# Patient Record
Sex: FEMALE | Race: WHITE | NOT HISPANIC OR LATINO | Employment: OTHER | URBAN - METROPOLITAN AREA
[De-identification: names, ages, dates, MRNs, and addresses within clinical notes are randomized per-mention and may not be internally consistent; named-entity substitution may affect disease eponyms.]

---

## 2023-03-10 PROBLEM — R41.3 MEMORY LOSS: Status: ACTIVE | Noted: 2023-03-10

## 2023-03-10 PROBLEM — R51.9 HEADACHE: Status: ACTIVE | Noted: 2023-03-10

## 2023-03-10 PROBLEM — M79.7 FIBROMYALGIA: Status: ACTIVE | Noted: 2023-03-10

## 2023-03-10 PROBLEM — R05.9 COUGH: Status: ACTIVE | Noted: 2023-03-10

## 2023-03-10 PROBLEM — E55.9 VITAMIN D DEFICIENCY: Status: ACTIVE | Noted: 2023-03-10

## 2023-03-10 PROBLEM — M47.816 LUMBAR SPONDYLOSIS: Status: ACTIVE | Noted: 2023-03-10

## 2023-03-10 PROBLEM — M51.379 DISC DEGENERATION, LUMBOSACRAL: Status: ACTIVE | Noted: 2023-03-10

## 2023-03-10 PROBLEM — J06.9 URI, ACUTE: Status: ACTIVE | Noted: 2023-03-10

## 2023-03-10 PROBLEM — R15.9 FECAL INCONTINENCE: Status: ACTIVE | Noted: 2023-03-10

## 2023-03-10 PROBLEM — G43.909 MIGRAINES: Status: ACTIVE | Noted: 2023-03-10

## 2023-03-10 PROBLEM — K58.9 IRRITABLE BOWEL SYNDROME: Status: ACTIVE | Noted: 2023-03-10

## 2023-03-10 PROBLEM — N64.89 FULLNESS OF BREAST: Status: ACTIVE | Noted: 2023-03-10

## 2023-03-10 PROBLEM — R90.89 ABNORMAL BRAIN MRI: Status: ACTIVE | Noted: 2023-03-10

## 2023-03-10 PROBLEM — H00.012 HORDEOLUM EXTERNUM OF RIGHT LOWER EYELID: Status: ACTIVE | Noted: 2023-03-10

## 2023-03-10 PROBLEM — M51.26 OTHER INTERVERTEBRAL DISC DISPLACEMENT, LUMBAR REGION: Status: ACTIVE | Noted: 2023-03-10

## 2023-03-10 PROBLEM — M19.90 ARTHRITIS: Status: ACTIVE | Noted: 2023-03-10

## 2023-03-10 PROBLEM — R06.83 SNORING: Status: ACTIVE | Noted: 2023-03-10

## 2023-03-10 PROBLEM — M43.10 DEGENERATIVE SPONDYLOLISTHESIS: Status: ACTIVE | Noted: 2023-03-10

## 2023-03-10 PROBLEM — L65.9 HAIR LOSS: Status: ACTIVE | Noted: 2023-03-10

## 2023-03-10 PROBLEM — G47.00 INSOMNIA: Status: ACTIVE | Noted: 2023-03-10

## 2023-03-10 PROBLEM — G47.19 EXCESSIVE DAYTIME SLEEPINESS: Status: ACTIVE | Noted: 2023-03-10

## 2023-03-10 PROBLEM — M51.37 DISC DEGENERATION, LUMBOSACRAL: Status: ACTIVE | Noted: 2023-03-10

## 2023-03-10 PROBLEM — G56.01 CARPAL TUNNEL SYNDROME OF RIGHT WRIST: Status: ACTIVE | Noted: 2023-03-10

## 2023-03-10 PROBLEM — F41.9 ANXIETY: Status: ACTIVE | Noted: 2023-03-10

## 2023-03-10 PROBLEM — R34 OLIGOURIA: Status: ACTIVE | Noted: 2023-03-10

## 2023-03-10 PROBLEM — R06.09 DYSPNEA ON EXERTION: Status: ACTIVE | Noted: 2023-03-10

## 2023-03-10 PROBLEM — G95.9 CERVICAL MYELOPATHY (MULTI): Status: ACTIVE | Noted: 2023-03-10

## 2023-03-10 PROBLEM — M96.1 POSTLAMINECTOMY SYNDROME, LUMBAR: Status: ACTIVE | Noted: 2023-03-10

## 2023-03-10 PROBLEM — M81.0 OSTEOPOROSIS: Status: ACTIVE | Noted: 2023-03-10

## 2023-03-10 PROBLEM — F32.A DEPRESSION: Status: ACTIVE | Noted: 2023-03-10

## 2023-03-10 PROBLEM — M51.27 OTHER INTERVERTEBRAL DISC DISPLACEMENT, LUMBOSACRAL REGION: Status: ACTIVE | Noted: 2023-03-10

## 2023-03-10 PROBLEM — G45.9 TIA (TRANSIENT ISCHEMIC ATTACK): Status: ACTIVE | Noted: 2023-03-10

## 2023-03-10 PROBLEM — M48.02 CERVICAL STENOSIS OF SPINE: Status: ACTIVE | Noted: 2023-03-10

## 2023-03-10 PROBLEM — E78.00 HYPERCHOLESTEROLEMIA: Status: ACTIVE | Noted: 2023-03-10

## 2023-03-10 PROBLEM — B02.9 HERPES ZOSTER: Status: ACTIVE | Noted: 2023-03-10

## 2023-03-10 PROBLEM — G43.409 HEMIPLEGIC MIGRAINE: Status: ACTIVE | Noted: 2023-03-10

## 2023-03-10 PROBLEM — M54.50 LUMBAGO: Status: ACTIVE | Noted: 2023-03-10

## 2023-03-10 PROBLEM — M54.16 LUMBAR RADICULOPATHY: Status: ACTIVE | Noted: 2023-03-10

## 2023-03-10 PROBLEM — G25.81 RESTLESS LEGS SYNDROME: Status: ACTIVE | Noted: 2023-03-10

## 2023-03-10 PROBLEM — R11.2 NAUSEA AND VOMITING: Status: ACTIVE | Noted: 2023-03-10

## 2023-03-10 PROBLEM — I67.1 CEREBRAL ANEURYSM, NONRUPTURED (HHS-HCC): Status: ACTIVE | Noted: 2023-03-10

## 2023-03-10 PROBLEM — R63.4 WEIGHT LOSS: Status: ACTIVE | Noted: 2023-03-10

## 2023-03-10 PROBLEM — M48.04 THORACIC SPINAL STENOSIS: Status: ACTIVE | Noted: 2023-03-10

## 2023-03-10 PROBLEM — E53.8 VITAMIN B12 DEFICIENCY: Status: ACTIVE | Noted: 2023-03-10

## 2023-03-10 PROBLEM — R07.89 CHEST WALL PAIN: Status: ACTIVE | Noted: 2023-03-10

## 2023-03-10 PROBLEM — R61 HYPERHIDROSIS: Status: ACTIVE | Noted: 2023-03-10

## 2023-03-10 PROBLEM — D69.6 THROMBOCYTOPENIA (CMS-HCC): Status: ACTIVE | Noted: 2023-03-10

## 2023-03-10 PROBLEM — R73.9 HYPERGLYCEMIA: Status: ACTIVE | Noted: 2023-03-10

## 2023-03-10 PROBLEM — R20.0 NUMBNESS OF HAND: Status: ACTIVE | Noted: 2023-03-10

## 2023-03-10 PROBLEM — G47.33 OSA (OBSTRUCTIVE SLEEP APNEA): Status: ACTIVE | Noted: 2023-03-10

## 2023-03-10 PROBLEM — K21.9 GASTROESOPHAGEAL REFLUX DISEASE: Status: ACTIVE | Noted: 2023-03-10

## 2023-03-10 PROBLEM — I63.9 STROKE (MULTI): Status: ACTIVE | Noted: 2023-03-10

## 2023-03-10 RX ORDER — BACLOFEN 10 MG/1
10 TABLET ORAL 3 TIMES DAILY
COMMUNITY

## 2023-03-10 RX ORDER — SERTRALINE HYDROCHLORIDE 100 MG/1
200 TABLET, FILM COATED ORAL EVERY MORNING
COMMUNITY
Start: 2016-08-01 | End: 2023-08-21 | Stop reason: SDUPTHER

## 2023-03-10 RX ORDER — QUETIAPINE FUMARATE 100 MG/1
100 TABLET, FILM COATED ORAL 2 TIMES DAILY PRN
COMMUNITY
Start: 2016-08-24

## 2023-03-10 RX ORDER — ACETAMINOPHEN 500 MG
5-10 TABLET ORAL NIGHTLY PRN
COMMUNITY

## 2023-03-10 RX ORDER — ONDANSETRON 4 MG/1
4 TABLET, FILM COATED ORAL EVERY 12 HOURS PRN
COMMUNITY
Start: 2022-09-26

## 2023-03-10 RX ORDER — GABAPENTIN 600 MG/1
1200 TABLET ORAL 2 TIMES DAILY
COMMUNITY

## 2023-03-10 RX ORDER — DICYCLOMINE HYDROCHLORIDE 10 MG/1
10 CAPSULE ORAL EVERY 6 HOURS PRN
COMMUNITY
Start: 2021-05-08 | End: 2023-12-08 | Stop reason: WASHOUT

## 2023-03-10 RX ORDER — TRAZODONE HYDROCHLORIDE 100 MG/1
100-200 TABLET ORAL NIGHTLY
COMMUNITY
Start: 2012-09-10 | End: 2023-12-08 | Stop reason: SDUPTHER

## 2023-03-10 RX ORDER — DIVALPROEX SODIUM 250 MG/1
1 TABLET, FILM COATED, EXTENDED RELEASE ORAL DAILY
COMMUNITY
Start: 2022-09-26 | End: 2023-10-10 | Stop reason: WASHOUT

## 2023-03-10 RX ORDER — PANTOPRAZOLE SODIUM 40 MG/1
40 TABLET, DELAYED RELEASE ORAL DAILY
COMMUNITY
End: 2023-10-10 | Stop reason: WASHOUT

## 2023-04-06 ENCOUNTER — OFFICE VISIT (OUTPATIENT)
Dept: PRIMARY CARE | Facility: CLINIC | Age: 68
End: 2023-04-06
Payer: MEDICARE

## 2023-04-06 VITALS
SYSTOLIC BLOOD PRESSURE: 130 MMHG | TEMPERATURE: 97.7 F | HEART RATE: 57 BPM | BODY MASS INDEX: 33.25 KG/M2 | DIASTOLIC BLOOD PRESSURE: 80 MMHG | OXYGEN SATURATION: 100 % | WEIGHT: 206 LBS

## 2023-04-06 DIAGNOSIS — K58.0 IRRITABLE BOWEL SYNDROME WITH DIARRHEA: ICD-10-CM

## 2023-04-06 DIAGNOSIS — L60.0 INGROWN TOENAIL OF BOTH FEET: ICD-10-CM

## 2023-04-06 DIAGNOSIS — F41.9 ANXIETY: ICD-10-CM

## 2023-04-06 DIAGNOSIS — R20.2 PARESTHESIA AND PAIN OF BOTH UPPER EXTREMITIES: ICD-10-CM

## 2023-04-06 DIAGNOSIS — B35.1 ONYCHOMYCOSIS: ICD-10-CM

## 2023-04-06 DIAGNOSIS — M79.602 PARESTHESIA AND PAIN OF BOTH UPPER EXTREMITIES: ICD-10-CM

## 2023-04-06 DIAGNOSIS — H61.91 EARLOBE LESION, RIGHT: ICD-10-CM

## 2023-04-06 DIAGNOSIS — G56.01 CARPAL TUNNEL SYNDROME OF RIGHT WRIST: ICD-10-CM

## 2023-04-06 DIAGNOSIS — Z00.00 MEDICARE WELCOME EXAM: Primary | ICD-10-CM

## 2023-04-06 DIAGNOSIS — Z12.31 BREAST CANCER SCREENING BY MAMMOGRAM: ICD-10-CM

## 2023-04-06 DIAGNOSIS — M79.601 PARESTHESIA AND PAIN OF BOTH UPPER EXTREMITIES: ICD-10-CM

## 2023-04-06 DIAGNOSIS — M79.621 AXILLARY PAIN, RIGHT: ICD-10-CM

## 2023-04-06 PROCEDURE — 99204 OFFICE O/P NEW MOD 45 MIN: CPT | Performed by: STUDENT IN AN ORGANIZED HEALTH CARE EDUCATION/TRAINING PROGRAM

## 2023-04-06 PROCEDURE — 1170F FXNL STATUS ASSESSED: CPT | Performed by: STUDENT IN AN ORGANIZED HEALTH CARE EDUCATION/TRAINING PROGRAM

## 2023-04-06 PROCEDURE — 1036F TOBACCO NON-USER: CPT | Performed by: STUDENT IN AN ORGANIZED HEALTH CARE EDUCATION/TRAINING PROGRAM

## 2023-04-06 PROCEDURE — 1159F MED LIST DOCD IN RCRD: CPT | Performed by: STUDENT IN AN ORGANIZED HEALTH CARE EDUCATION/TRAINING PROGRAM

## 2023-04-06 PROCEDURE — 1124F ACP DISCUSS-NO DSCNMKR DOCD: CPT | Performed by: STUDENT IN AN ORGANIZED HEALTH CARE EDUCATION/TRAINING PROGRAM

## 2023-04-06 PROCEDURE — 1160F RVW MEDS BY RX/DR IN RCRD: CPT | Performed by: STUDENT IN AN ORGANIZED HEALTH CARE EDUCATION/TRAINING PROGRAM

## 2023-04-06 PROCEDURE — G0438 PPPS, INITIAL VISIT: HCPCS | Performed by: STUDENT IN AN ORGANIZED HEALTH CARE EDUCATION/TRAINING PROGRAM

## 2023-04-06 RX ORDER — CLONAZEPAM 0.5 MG/1
1 TABLET ORAL DAILY PRN
Qty: 15 TABLET | Refills: 2 | Status: SHIPPED | OUTPATIENT
Start: 2023-04-06 | End: 2023-10-09 | Stop reason: SDUPTHER

## 2023-04-06 RX ORDER — OXYCODONE AND ACETAMINOPHEN 7.5; 325 MG/1; MG/1
1 TABLET ORAL 3 TIMES DAILY
COMMUNITY
Start: 2020-09-16

## 2023-04-06 ASSESSMENT — ENCOUNTER SYMPTOMS
NERVOUS/ANXIOUS: 1
LIGHT-HEADEDNESS: 0
CARDIOVASCULAR NEGATIVE: 1
SLEEP DISTURBANCE: 1
DIARRHEA: 1
CONSTITUTIONAL NEGATIVE: 1
ARTHRALGIAS: 1
NEUROLOGICAL NEGATIVE: 1
RESPIRATORY NEGATIVE: 1
HEADACHES: 0

## 2023-04-06 ASSESSMENT — PATIENT HEALTH QUESTIONNAIRE - PHQ9
SUM OF ALL RESPONSES TO PHQ9 QUESTIONS 1 AND 2: 0
1. LITTLE INTEREST OR PLEASURE IN DOING THINGS: NOT AT ALL
2. FEELING DOWN, DEPRESSED OR HOPELESS: NOT AT ALL

## 2023-04-06 ASSESSMENT — ACTIVITIES OF DAILY LIVING (ADL)
DRESSING: INDEPENDENT
BATHING: INDEPENDENT

## 2023-04-06 ASSESSMENT — VISUAL ACUITY
OS_CC: 20/40
OD_CC: 20/25

## 2023-04-06 NOTE — PROGRESS NOTES
Subjective   Patient ID: Ruth Ann Martinez is a 67 y.o. female who presents for Medicare Annual Wellness Visit Initial (Medicare welcome visit/Carpal tunnel/Lump on ear that is small and painful/Right breast tenderness).  R ear nodule - noticed it for the past several months, unchanged, red and irritated from picking at it, no drainage/fluctuance, no history of skin cancer.    Carpal tunnel R wrist - mildly improved since wearing wrist splint to bed, but still has pain and numbness in her thumb and is interested in surgical correction.    R breast tenderness - worsening past 2 weeks, thinks she may have over worked her R arm, she has never had a mammogram    R Toe discoloration and pain - Going on for several months, mostly unsightly, but tender when she picks at it.     She is going to have an eye appointment soon.     Reports chronic diarrhea and bloating. Suspects IBS. Up to date on colon cancer screening.         Benzodiazepines:  What is the patient's goal of therapy? Anxiety reduction, better physical functioning, less panic attacks  Is this being achieved with current treatment? Starting treatment      Activities of Daily Living:   Is your overall impression that this patient is benefiting (symptom reduction outweighs side effects) from benzodiazepine therapy? Re initiating therapy    Initiating therapy, will need to reassess functioning once on treatment    Review of Systems   Constitutional: Negative.    HENT: Negative.     Respiratory: Negative.     Cardiovascular: Negative.    Gastrointestinal:  Positive for diarrhea.   Musculoskeletal:  Positive for arthralgias.   Skin:         Lesion on right ear   Neurological: Negative.  Negative for light-headedness and headaches.   Psychiatric/Behavioral:  Positive for sleep disturbance. The patient is nervous/anxious.        Objective   Physical Exam  Constitutional:       General: She is not in acute distress.     Appearance: She is not toxic-appearing.   HENT:       Head: Normocephalic and atraumatic.      Mouth/Throat:      Mouth: Mucous membranes are moist.   Eyes:      Conjunctiva/sclera: Conjunctivae normal.   Cardiovascular:      Rate and Rhythm: Normal rate and regular rhythm.      Pulses: Normal pulses.   Pulmonary:      Effort: Pulmonary effort is normal.      Breath sounds: Normal breath sounds.   Abdominal:      General: There is no distension.      Tenderness: There is no abdominal tenderness.   Musculoskeletal:         General: Normal range of motion.      Comments: Positive Tinel sign r wrist   Skin:     General: Skin is warm and dry.      Comments: R upper ear with 1x1cm raised firm red nodule   Neurological:      General: No focal deficit present.      Mental Status: She is alert. Mental status is at baseline.   Psychiatric:      Comments: Nervous         Current Outpatient Medications:     baclofen (Lioresal) 10 mg tablet, Take 1 tablet (10 mg) by mouth in the morning and 1 tablet (10 mg) in the evening and 1 tablet (10 mg) before bedtime., Disp: , Rfl:     dicyclomine (Bentyl) 10 mg capsule, Take 1 capsule (10 mg) by mouth every 6 hours if needed., Disp: , Rfl:     gabapentin (Neurontin) 600 mg tablet, Take 2 tablets (1,200 mg) by mouth in the morning and 2 tablets (1,200 mg) before bedtime., Disp: , Rfl:     melatonin 5 mg tablet, Take 1-2 tablets (5-10 mg) by mouth as needed at bedtime., Disp: , Rfl:     ondansetron (Zofran) 4 mg tablet, Take 1 tablet (4 mg) by mouth every 12 hours if needed for nausea or vomiting., Disp: , Rfl:     oxyCODONE-acetaminophen (Percocet) 7.5-325 mg tablet, Take 1 tablet by mouth every 8 hours if needed., Disp: , Rfl:     QUEtiapine (SEROquel) 100 mg tablet, Take 1 tablet (100 mg) by mouth 2 times a day as needed (FOR HEADACHE OR PAIN OR SLEEP. MAY INCREASE TO 2 TABLETS AT BEDTIME)., Disp: , Rfl:     sertraline (Zoloft) 100 mg tablet, Take 2 tablets (200 mg) by mouth once daily in the morning., Disp: , Rfl:     traZODone  (Desyrel) 100 mg tablet, Take 1-2 tablets (100-200 mg) by mouth once daily at bedtime., Disp: , Rfl:     clonazePAM (KlonoPIN) 0.5 mg tablet, Take 2 tablets (1 mg) by mouth once daily as needed for anxiety., Disp: 15 tablet, Rfl: 2    divalproex (Depakote ER) 250 mg 24 hr tablet, Take 1 tablet (250 mg) by mouth once daily., Disp: , Rfl:     pantoprazole (ProtoNix) 40 mg EC tablet, Take 1 tablet (40 mg) by mouth once daily., Disp: , Rfl:     ubrogepant (Ubrelvy) 100 mg tablet tablet, Take 1 tablet (100 mg) by mouth 1 time. The patient may repeat the dose in 2 hours if there is no headache relief., Disp: , Rfl:       Assessment/Plan   Diagnoses and all orders for this visit:  Medicare welcome exam  Comments:  mammogram ordered  defer DEXA to later visit  up to date on colonoscopy  Carpal tunnel syndrome of right wrist  Comments:  Mildly improved with HS Wrist splint  Orders:  -     Referral to Orthopaedic Surgery; Future  Ingrown toenail of both feet  -     Referral to Podiatry; Future  Onychomycosis  -     Referral to Podiatry; Future  Paresthesia and pain of both upper extremities  Breast cancer screening by mammogram  -     BI mammo left screening tomosynthesis; Future  Axillary pain, right  -     BI mammo right diagnostic; Future  -     BI US breast complete right; Future  Anxiety  -     Referral to [facility]; Future  -     clonazePAM (KlonoPIN) 0.5 mg tablet; Take 2 tablets (1 mg) by mouth once daily as needed for anxiety.  Earlobe lesion, right  -     Referral to Dermatology; Future  Irritable bowel syndrome with diarrhea  Comments:  xifaxin course given    The patient received Provided instructions on exercise because they have an above normal BMI.    OARRS report reviewed for Ruth Ann Martinez today and is consistent with prescribed therapy.  We weighed the risks and benefit of this therapy and will continue with the current plan       Follow up in 3 months      Cooike Luciano,

## 2023-06-20 ENCOUNTER — APPOINTMENT (OUTPATIENT)
Dept: PRIMARY CARE | Facility: CLINIC | Age: 68
End: 2023-06-20
Payer: MEDICARE

## 2023-06-22 ENCOUNTER — OFFICE VISIT (OUTPATIENT)
Dept: PRIMARY CARE | Facility: CLINIC | Age: 68
End: 2023-06-22
Payer: MEDICARE

## 2023-06-22 VITALS
SYSTOLIC BLOOD PRESSURE: 126 MMHG | DIASTOLIC BLOOD PRESSURE: 78 MMHG | HEART RATE: 96 BPM | BODY MASS INDEX: 33.41 KG/M2 | OXYGEN SATURATION: 95 % | WEIGHT: 207 LBS

## 2023-06-22 DIAGNOSIS — N30.00 ACUTE CYSTITIS WITHOUT HEMATURIA: Primary | ICD-10-CM

## 2023-06-22 DIAGNOSIS — R10.9 FLANK PAIN: ICD-10-CM

## 2023-06-22 PROBLEM — R53.1 WEAKNESS: Status: ACTIVE | Noted: 2020-10-20

## 2023-06-22 PROBLEM — C53.9 CERVICAL CANCER (MULTI): Status: ACTIVE | Noted: 2023-06-22

## 2023-06-22 PROBLEM — D50.9 ANEMIA, IRON DEFICIENCY: Status: ACTIVE | Noted: 2023-06-22

## 2023-06-22 PROBLEM — R26.9 GAIT ABNORMALITY: Status: ACTIVE | Noted: 2017-04-20

## 2023-06-22 PROBLEM — D61.818 PANCYTOPENIA (MULTI): Chronic | Status: ACTIVE | Noted: 2020-10-20

## 2023-06-22 PROBLEM — R19.7 DIARRHEA: Status: ACTIVE | Noted: 2023-06-22

## 2023-06-22 PROBLEM — R47.1 DYSARTHRIA: Status: ACTIVE | Noted: 2017-04-20

## 2023-06-22 LAB
APPEARANCE UR: CLEAR
BILIRUB UR QL STRIP: NEGATIVE
COLOR UR: YELLOW
GLUCOSE UR STRIP-MCNC: NEGATIVE MG/DL
HGB UR QL STRIP: NEGATIVE
KETONES UR STRIP-MCNC: NEGATIVE MG/DL
LEUKOCYTE ESTERASE UR QL STRIP: ABNORMAL
NITRITE UR QL STRIP: NEGATIVE
PH UR STRIP: 5.5 [PH]
PROT UR STRIP-MCNC: NEGATIVE MG/DL
SP GR UR STRIP.AUTO: 1.02
UROBILINOGEN UR STRIP-ACNC: 0.2 E.U./DL

## 2023-06-22 PROCEDURE — 1036F TOBACCO NON-USER: CPT | Performed by: STUDENT IN AN ORGANIZED HEALTH CARE EDUCATION/TRAINING PROGRAM

## 2023-06-22 PROCEDURE — 3074F SYST BP LT 130 MM HG: CPT | Performed by: STUDENT IN AN ORGANIZED HEALTH CARE EDUCATION/TRAINING PROGRAM

## 2023-06-22 PROCEDURE — 99214 OFFICE O/P EST MOD 30 MIN: CPT | Performed by: STUDENT IN AN ORGANIZED HEALTH CARE EDUCATION/TRAINING PROGRAM

## 2023-06-22 PROCEDURE — 3078F DIAST BP <80 MM HG: CPT | Performed by: STUDENT IN AN ORGANIZED HEALTH CARE EDUCATION/TRAINING PROGRAM

## 2023-06-22 PROCEDURE — 81003 URINALYSIS AUTO W/O SCOPE: CPT | Performed by: STUDENT IN AN ORGANIZED HEALTH CARE EDUCATION/TRAINING PROGRAM

## 2023-06-22 PROCEDURE — 1159F MED LIST DOCD IN RCRD: CPT | Performed by: STUDENT IN AN ORGANIZED HEALTH CARE EDUCATION/TRAINING PROGRAM

## 2023-06-22 PROCEDURE — 1160F RVW MEDS BY RX/DR IN RCRD: CPT | Performed by: STUDENT IN AN ORGANIZED HEALTH CARE EDUCATION/TRAINING PROGRAM

## 2023-06-22 ASSESSMENT — ENCOUNTER SYMPTOMS: FLANK PAIN: 1

## 2023-06-22 NOTE — PROGRESS NOTES
Subjective   Reason for Visit: Ruth Ann Martinez is an 67 y.o. female here for a sic visit.          Reviewed all medications by prescribing practitioner or clinical pharmacist (such as prescriptions, OTCs, herbal therapies and supplements) and documented in the medical record.    Flank Pain        Flank pain: starte 1 week started in back an moved to front. Mostly there. Sitting standin,g move makes it worse, no fevers, some nausea after she eat, urination is no change. Not moving makes it better. States its getting worse. Some belly pain.     Patient Care Team:  Cookie Luciano DO as PCP - General  Cookie Luciano DO as PCP - [employer] PCP     Review of Systems   Genitourinary:  Positive for flank pain.   All other systems reviewed and are negative.      Objective   Vitals:  /78 (BP Location: Left arm, Patient Position: Sitting)   Pulse 96   Wt 93.9 kg (207 lb)   SpO2 95%   BMI 33.41 kg/m²       Physical Exam  Constitutional:       Appearance: Normal appearance.   HENT:      Head: Normocephalic and atraumatic.      Right Ear: Tympanic membrane and ear canal normal.      Left Ear: Tympanic membrane and ear canal normal.      Mouth/Throat:      Mouth: Mucous membranes are moist.      Pharynx: Oropharynx is clear.   Eyes:      Extraocular Movements: Extraocular movements intact.      Conjunctiva/sclera: Conjunctivae normal.      Pupils: Pupils are equal, round, and reactive to light.   Cardiovascular:      Rate and Rhythm: Normal rate and regular rhythm.      Pulses: Normal pulses.      Heart sounds: Normal heart sounds.   Pulmonary:      Effort: Pulmonary effort is normal.      Breath sounds: Normal breath sounds.   Abdominal:      General: Abdomen is flat. Bowel sounds are normal.      Palpations: Abdomen is soft.   Musculoskeletal:         General: Normal range of motion.      Cervical back: Normal range of motion and neck supple.   Skin:     General: Skin is warm and dry.       Capillary Refill: Capillary refill takes 2 to 3 seconds.   Neurological:      General: No focal deficit present.      Mental Status: She is alert and oriented to person, place, and time. Mental status is at baseline.   Psychiatric:         Mood and Affect: Mood normal.         Behavior: Behavior normal.         Thought Content: Thought content normal.         Judgment: Judgment normal.         Assessment/Plan   1. Acute cystitis without hematuria  - shows trace  - Urinalysis Microscopic Only; Future      2. Flank pain  - ua shows trasceLE  - very uncomfortable  - if no better go to ER  - CT abdomen pelvis wo IV contrast; Future

## 2023-07-24 ENCOUNTER — DOCUMENTATION (OUTPATIENT)
Dept: PRIMARY CARE | Facility: CLINIC | Age: 68
End: 2023-07-24
Payer: MEDICARE

## 2023-07-24 ENCOUNTER — PATIENT OUTREACH (OUTPATIENT)
Dept: CARDIOLOGY | Facility: CLINIC | Age: 68
End: 2023-07-24
Payer: MEDICARE

## 2023-07-24 DIAGNOSIS — K57.92 DIVERTICULITIS: ICD-10-CM

## 2023-07-24 NOTE — PROGRESS NOTES
Discharge Facility: [facility]  Discharge Diagnosis: diverticulitis  Admission Date: 7-20-23  Discharge Date: 7-21-23    PCP Appointment Date: none--will task office  Specialist Appointment Date: Seen by GI  Hospital Encounter and Summary: Linked   See discharge assessment below for further details

## 2023-08-08 ENCOUNTER — PATIENT OUTREACH (OUTPATIENT)
Dept: CARDIOLOGY | Facility: CLINIC | Age: 68
End: 2023-08-08
Payer: MEDICARE

## 2023-08-08 NOTE — PROGRESS NOTES
Unable to reach patient for call back after patient's follow up appointment with PCP.   ANDREYM with call back number for patient to call if needed   If no voicemail available call attempts x 2 were made to contact the patient to assist with any questions or concerns patient may have.

## 2023-08-14 ENCOUNTER — TELEPHONE (OUTPATIENT)
Dept: PRIMARY CARE | Facility: CLINIC | Age: 68
End: 2023-08-14
Payer: MEDICARE

## 2023-08-14 NOTE — TELEPHONE ENCOUNTER
----- Message from Cookie Luciano DO sent at 8/10/2023  5:13 PM EDT -----  Follow up breast imaging showed complex cyst, not concerning    Repeat mammogram in 1 year

## 2023-08-21 ENCOUNTER — PATIENT MESSAGE (OUTPATIENT)
Dept: PRIMARY CARE | Facility: CLINIC | Age: 68
End: 2023-08-21
Payer: MEDICARE

## 2023-08-21 DIAGNOSIS — F41.9 ANXIETY: Primary | ICD-10-CM

## 2023-08-21 RX ORDER — SERTRALINE HYDROCHLORIDE 100 MG/1
200 TABLET, FILM COATED ORAL EVERY MORNING
Qty: 180 TABLET | Refills: 1 | Status: SHIPPED | OUTPATIENT
Start: 2023-08-21 | End: 2023-08-21 | Stop reason: SDUPTHER

## 2023-08-21 RX ORDER — SERTRALINE HYDROCHLORIDE 100 MG/1
100 TABLET, FILM COATED ORAL EVERY MORNING
Qty: 90 TABLET | Refills: 1 | Status: SHIPPED | OUTPATIENT
Start: 2023-08-21 | End: 2024-02-06 | Stop reason: SDUPTHER

## 2023-08-22 ENCOUNTER — PATIENT OUTREACH (OUTPATIENT)
Dept: CARDIOLOGY | Facility: CLINIC | Age: 68
End: 2023-08-22
Payer: MEDICARE

## 2023-10-09 DIAGNOSIS — F41.9 ANXIETY: ICD-10-CM

## 2023-10-10 RX ORDER — SODIUM CHLORIDE, SODIUM LACTATE, POTASSIUM CHLORIDE, CALCIUM CHLORIDE 600; 310; 30; 20 MG/100ML; MG/100ML; MG/100ML; MG/100ML
20 INJECTION, SOLUTION INTRAVENOUS CONTINUOUS
Status: CANCELLED | OUTPATIENT
Start: 2023-10-10

## 2023-10-11 ENCOUNTER — ANESTHESIA EVENT (OUTPATIENT)
Dept: GASTROENTEROLOGY | Facility: HOSPITAL | Age: 68
End: 2023-10-11
Payer: MEDICARE

## 2023-10-11 ENCOUNTER — HOSPITAL ENCOUNTER (OUTPATIENT)
Dept: GASTROENTEROLOGY | Facility: HOSPITAL | Age: 68
Discharge: HOME | End: 2023-10-11
Payer: MEDICARE

## 2023-10-11 ENCOUNTER — ANESTHESIA (OUTPATIENT)
Dept: GASTROENTEROLOGY | Facility: HOSPITAL | Age: 68
End: 2023-10-11
Payer: MEDICARE

## 2023-10-11 VITALS
HEART RATE: 68 BPM | DIASTOLIC BLOOD PRESSURE: 84 MMHG | BODY MASS INDEX: 31.63 KG/M2 | OXYGEN SATURATION: 96 % | HEIGHT: 67 IN | RESPIRATION RATE: 16 BRPM | WEIGHT: 201.5 LBS | SYSTOLIC BLOOD PRESSURE: 156 MMHG | TEMPERATURE: 97.9 F

## 2023-10-11 DIAGNOSIS — Z00.00 ENCOUNTER FOR GENERAL ADULT MEDICAL EXAMINATION WITHOUT ABNORMAL FINDINGS: ICD-10-CM

## 2023-10-11 DIAGNOSIS — R13.10 DYSPHAGIA: ICD-10-CM

## 2023-10-11 DIAGNOSIS — R13.10 DYSPHAGIA: Primary | ICD-10-CM

## 2023-10-11 PROCEDURE — A45380 PR COLONOSCOPY,BIOPSY: Performed by: ANESTHESIOLOGY

## 2023-10-11 PROCEDURE — 88305 TISSUE EXAM BY PATHOLOGIST: CPT | Mod: TC | Performed by: INTERNAL MEDICINE

## 2023-10-11 PROCEDURE — 7100000009 HC PHASE TWO TIME - INITIAL BASE CHARGE

## 2023-10-11 PROCEDURE — 45380 COLONOSCOPY AND BIOPSY: CPT | Performed by: INTERNAL MEDICINE

## 2023-10-11 PROCEDURE — 2580000001 HC RX 258 IV SOLUTIONS: Performed by: INTERNAL MEDICINE

## 2023-10-11 PROCEDURE — 2580000001 HC RX 258 IV SOLUTIONS: Performed by: NURSE ANESTHETIST, CERTIFIED REGISTERED

## 2023-10-11 PROCEDURE — 88305 TISSUE EXAM BY PATHOLOGIST: CPT | Performed by: PATHOLOGY

## 2023-10-11 PROCEDURE — 3700000002 HC GENERAL ANESTHESIA TIME - EACH INCREMENTAL 1 MINUTE

## 2023-10-11 PROCEDURE — 2500000005 HC RX 250 GENERAL PHARMACY W/O HCPCS: Performed by: NURSE ANESTHETIST, CERTIFIED REGISTERED

## 2023-10-11 PROCEDURE — A45380 PR COLONOSCOPY,BIOPSY: Performed by: NURSE ANESTHETIST, CERTIFIED REGISTERED

## 2023-10-11 PROCEDURE — 43239 EGD BIOPSY SINGLE/MULTIPLE: CPT | Performed by: INTERNAL MEDICINE

## 2023-10-11 PROCEDURE — 3700000001 HC GENERAL ANESTHESIA TIME - INITIAL BASE CHARGE

## 2023-10-11 PROCEDURE — 7100000010 HC PHASE TWO TIME - EACH INCREMENTAL 1 MINUTE

## 2023-10-11 PROCEDURE — 2500000004 HC RX 250 GENERAL PHARMACY W/ HCPCS (ALT 636 FOR OP/ED): Performed by: NURSE ANESTHETIST, CERTIFIED REGISTERED

## 2023-10-11 RX ORDER — PROPOFOL 10 MG/ML
INJECTION, EMULSION INTRAVENOUS AS NEEDED
Status: DISCONTINUED | OUTPATIENT
Start: 2023-10-11 | End: 2023-10-11

## 2023-10-11 RX ORDER — SODIUM CHLORIDE, SODIUM LACTATE, POTASSIUM CHLORIDE, CALCIUM CHLORIDE 600; 310; 30; 20 MG/100ML; MG/100ML; MG/100ML; MG/100ML
20 INJECTION, SOLUTION INTRAVENOUS CONTINUOUS
Status: DISCONTINUED | OUTPATIENT
Start: 2023-10-11 | End: 2023-10-19

## 2023-10-11 RX ORDER — LIDOCAINE HYDROCHLORIDE 20 MG/ML
INJECTION, SOLUTION INFILTRATION; PERINEURAL AS NEEDED
Status: DISCONTINUED | OUTPATIENT
Start: 2023-10-11 | End: 2023-10-11

## 2023-10-11 RX ORDER — PROPOFOL 10 MG/ML
INJECTION, EMULSION INTRAVENOUS CONTINUOUS PRN
Status: DISCONTINUED | OUTPATIENT
Start: 2023-10-11 | End: 2023-10-11

## 2023-10-11 RX ORDER — CLONAZEPAM 0.5 MG/1
1 TABLET ORAL DAILY PRN
Qty: 15 TABLET | Refills: 2 | Status: SHIPPED | OUTPATIENT
Start: 2023-10-11 | End: 2024-01-05 | Stop reason: SDUPTHER

## 2023-10-11 RX ADMIN — PROPOFOL 100 MCG/KG/MIN: 10 INJECTION, EMULSION INTRAVENOUS at 12:38

## 2023-10-11 RX ADMIN — PROPOFOL 100 MG: 10 INJECTION, EMULSION INTRAVENOUS at 12:38

## 2023-10-11 RX ADMIN — SODIUM CHLORIDE, POTASSIUM CHLORIDE, SODIUM LACTATE AND CALCIUM CHLORIDE: 600; 310; 30; 20 INJECTION, SOLUTION INTRAVENOUS at 12:28

## 2023-10-11 RX ADMIN — PROPOFOL 50 MG: 10 INJECTION, EMULSION INTRAVENOUS at 12:50

## 2023-10-11 RX ADMIN — PROPOFOL 20 MG: 10 INJECTION, EMULSION INTRAVENOUS at 12:45

## 2023-10-11 RX ADMIN — PROPOFOL 30 MG: 10 INJECTION, EMULSION INTRAVENOUS at 12:55

## 2023-10-11 RX ADMIN — LIDOCAINE HYDROCHLORIDE 100 MG: 20 INJECTION, SOLUTION INFILTRATION; PERINEURAL at 12:38

## 2023-10-11 RX ADMIN — SODIUM CHLORIDE, POTASSIUM CHLORIDE, SODIUM LACTATE AND CALCIUM CHLORIDE 20 ML/HR: 600; 310; 30; 20 INJECTION, SOLUTION INTRAVENOUS at 12:15

## 2023-10-11 SDOH — HEALTH STABILITY: MENTAL HEALTH: CURRENT SMOKER: 0

## 2023-10-11 ASSESSMENT — PAIN - FUNCTIONAL ASSESSMENT
PAIN_FUNCTIONAL_ASSESSMENT: 0-10

## 2023-10-11 ASSESSMENT — PAIN SCALES - GENERAL
PAINLEVEL_OUTOF10: 0 - NO PAIN
PAIN_LEVEL: 0

## 2023-10-11 NOTE — ANESTHESIA POSTPROCEDURE EVALUATION
Patient: Ruth Ann Martinez    Procedure Summary       Date: 10/11/23 Room / Location: [facility]    Anesthesia Start: 1229 Anesthesia Stop:     Procedures:       EGD      COLONOSCOPY Diagnosis:       Dysphagia      Encounter for general adult medical examination without abnormal findings      Dysphagia    Scheduled Providers: Sukh Engel MD Responsible Provider: FRANCHESKA Briceño    Anesthesia Type: MAC ASA Status: 3            Anesthesia Type: MAC    Vitals Value Taken Time   /74 10/11/23 1319   Temp 36 10/11/23 1319   Pulse 72 10/11/23 1319   Resp 16 10/11/23 1319   SpO2 94 10/11/23 1319       Anesthesia Post Evaluation    Patient location during evaluation: PACU  Level of consciousness: awake  Pain score: 0  Pain management: adequate  Airway patency: patent  Cardiovascular status: acceptable  Respiratory status: acceptable  Hydration status: acceptable        No notable events documented.

## 2023-10-11 NOTE — ANESTHESIA PREPROCEDURE EVALUATION
Patient: Ruth Ann Martinez    Procedure Information       Date/Time: 10/11/23 1200    Scheduled providers: Sukh Engel MD    Procedures:       EGD      COLONOSCOPY    Location: [facility]            Relevant Problems   Cardiovascular   (+) Cerebral aneurysm, nonruptured   (+) Chest wall pain   (+) HTN (hypertension)   (+) Hypercholesterolemia      GI   (+) Irritable bowel syndrome      Neuro/Psych   (+) Anxiety state   (+) Carpal tunnel syndrome of right wrist   (+) Cerebral aneurysm, nonruptured   (+) Convulsions (CMS/HCC)   (+) Depression   (+) CHASIDY (generalized anxiety disorder)   (+) Lumbar radiculopathy   (+) Major depressive disorder, severe (CMS/HCC)   (+) Multiple sclerosis (CMS/HCC)   (+) Seizure disorder (CMS/HCC)   (+) Stroke (CMS/HCC)      Pulmonary   (+) Dyspnea on exertion   (+) ROSAMARIA (obstructive sleep apnea)      Hematology   (+) Anemia   (+) Anemia, iron deficiency   (+) Pancytopenia (CMS/HCC)   (+) Thrombocytopenia (CMS/HCC)      Musculoskeletal   (+) Carpal tunnel syndrome of right wrist   (+) Cervical stenosis of spine   (+) Disc degeneration, lumbosacral   (+) Fibromyalgia   (+) Lumbar spondylosis   (+) Other intervertebral disc displacement, lumbar region   (+) Thoracic spinal stenosis      Infectious Disease   (+) Herpes zoster   (+) URI, acute      Other   (+) Arthritis       Clinical information reviewed:   Tobacco  Allergies  Meds   Med Hx  Surg Hx   Fam Hx          NPO Detail:  NPO/Void Status  Date of Last Liquid: 10/11/23  Time of Last Liquid: 0000  Date of Last Solid: 10/09/23  Time of Last Solid: 0000  Last Intake Type: Clear fluids ([place] this morning.)         Physical Exam    Airway  Mallampati: II  TM distance: >3 FB  Neck ROM: full     Cardiovascular - normal exam  Rhythm: regular  Rate: normal     Dental   (+) upper dentures     Pulmonary - normal exam     Abdominal            Anesthesia Plan    ASA 3     MAC     The patient is not a current  smoker.    intravenous induction   Anesthetic plan and risks discussed with patient.    Plan discussed with CRNA.

## 2023-10-11 NOTE — DISCHARGE INSTRUCTIONS
Patient Instructions after a Colonoscopy      The anesthetics, sedatives or narcotics which were given to you today will be acting in your body for the next 24 hours, so you might feel a little sleepy or groggy.  This feeling should slowly wear off. Carefully read and follow the instructions.     You received sedation today:  - Do not drive or operate any machinery or power tools of any kind.   - No alcoholic beverages today, not even beer or wine.  - Do not make any important decisions or sign any legal documents.  - No over the counter medications that contain alcohol or that may cause drowsiness.  - Do not make any important decisions or sign any legal documents.    While it is common to experience mild to moderate abdominal distention, gas, or belching after your procedure, if any of these symptoms occur following discharge from the GI Lab or within one week of having your procedure, call the [facility] to be advised whether a visit to your nearest Urgent Care or Emergency Department is indicated.  Take this paper with you if you go.     - If you develop an allergic reaction to the medications that were given during your procedure such as difficulty breathing, rash, hives, severe nausea, vomiting or lightheadedness.  - If you experience chest pain, shortness of breath, severe abdominal pain, fevers and chills.  -If you develop signs and symptoms of bleeding such as blood in your spit, if your stools turn black, tarry, or bloody  - If you have not urinated within 8 hours following your procedure.  - If your IV site becomes painful, red, inflamed, or looks infected.    If you received a biopsy/polypectomy/sphincterotomy the following instructions apply below:    __ Do not use Aspirin containing products, non-steroidal medications or anti-coagulants for one week following your procedure. (Examples of these types of medications are: Advil, Arthrotec, Aleve, Coumadin, Ecotrin, Heparin, Ibuprofen,  Indocin, Motrin, Naprosyn, Nuprin, Plavix, Vioxx, and Voltarin, or their generic forms.  This list is not all-inclusive.  Check with your physician or pharmacist before resuming medications.)   __ Eat a soft diet today.  Avoid foods that are poorly digested for the next 24 hours.  These foods would include: nuts, beans, lettuce, red meats, and fried foods. Start with liquids and advance your diet as tolerated, gradually work up to eating solids.   __ Do not have a Barium Study or Enema for one week.    Your physician recommends the additional following instructions:    -You have a contact number available for emergencies. The signs and symptoms of potential delayed complications were discussed with you. You may return to normal activities tomorrow.  -Resume your previous diet.  -Continue your present medications.   -We are waiting for your pathology results.  -Your physician has recommended a repeat colonoscopy (date to be determined after pending pathology results are reviewed) for surveillance based on pathology results.  -The findings and recommendations have been discussed with you.  -The findings and recommendations were discussed with your family.  - Please see Medication Reconciliation Form for new medication/medications prescribed.       If you experience any problems or have any questions following discharge from the GI Lab, please call:        Nurse Signature                                                                        Date___________________                                                                            Patient/Responsible Party Signature                                        Date___________________

## 2023-10-11 NOTE — Clinical Note
CART REPOSITIONED FOR COLONOSCOPY
Patient tolerated the procedure well and is comfortable. Patient transported to PACU via stretcher. Handoff completed. 
167.64

## 2023-10-11 NOTE — H&P
History Of Present Illness  Ruth Ann Martinez is a 68 y.o. female presenting with .  Diarrhea and possible liver disease.     Past Medical History  Past Medical History:   Diagnosis Date    Abdominal distension (gaseous) 06/11/2018    Abdominal bloating    Cellulitis of face 12/29/2016    Cellulitis, face    Encounter for screening for malignant neoplasm of cervix     Pap smear for cervical cancer screening    Impacted cerumen, bilateral 07/01/2016    Bilateral impacted cerumen    Impacted cerumen, left ear 01/26/2015    Left ear impacted cerumen    Ocular pain, left eye 12/22/2016    Eye pain, left    Other intervertebral disc displacement, lumbar region     Lumbar herniated disc    Personal history of diseases of the blood and blood-forming organs and certain disorders involving the immune mechanism 08/30/2019    History of thrombocytopenia    Personal history of malignant neoplasm of cervix uteri     History of malignant neoplasm of cervix uteri    Personal history of other diseases of the digestive system     History of hemorrhoids    Personal history of other diseases of the respiratory system 03/22/2016    History of bronchitis    Personal history of other diseases of the respiratory system 07/01/2016    History of sore throat    Personal history of other diseases of urinary system 06/15/2015    History of hematuria    Personal history of other drug therapy 10/03/2016    History of influenza vaccination    Personal history of other medical treatment     H/O mammogram    Personal history of other medical treatment     Transfusion history    Personal history of other specified conditions 02/23/2018    History of abdominal pain    Personal history of other specified conditions 08/04/2015    History of dysuria    Personal history of other specified conditions 07/14/2014    History of fever    Personal history of other specified conditions 05/04/2018    History of confusion    Spinal stenosis, lumbar region without  "neurogenic claudication     Lumbar stenosis       Surgical History  Past Surgical History:   Procedure Laterality Date    COLONOSCOPY  08/26/2017    Complete Colonoscopy    CT ANGIO CORONARY ART WITH HEARTFLOW IF SCORE >30%  2/1/2021    CT HEART CORONARY ANGIOGRAM 2/1/2021 [facility]    KNEE ARTHROSCOPY W/ DEBRIDEMENT  09/09/2013    Arthroscopy Knee    LUMBAR FUSION  03/05/2014    Lumbar Vertebral Fusion    LUMBAR LAMINECTOMY  09/09/2013    Laminectomy Lumbar    MR HEAD ANGIO WO IV CONTRAST  9/10/2022    MR HEAD ANGIO WO IV CONTRAST 9/10/2022 [facility]    MR NECK ANGIO WO IV CONTRAST  9/10/2022    MR NECK ANGIO WO IV CONTRAST 9/10/2022 [facility]    OTHER SURGICAL HISTORY  09/09/2013    Nerve Block Transforaminal Epidural Lumbar    OTHER SURGICAL HISTORY  01/26/2015    Cervical Conization By Cold Knife    OTHER SURGICAL HISTORY  06/15/2015    Ovarian Cystectomy    ROTATOR CUFF REPAIR  09/09/2013    Rotator Cuff Repair    TOTAL KNEE ARTHROPLASTY  09/09/2013    Knee Replacement    TUBAL LIGATION  06/15/2015    Tubal Ligation        Social History  She reports that she has never smoked. She has never used smokeless tobacco. She reports that she does not drink alcohol and does not use drugs.    Family History  Family History   Problem Relation Name Age of Onset    Pernicious anemia Father      Pancreatic cancer Father          Allergies  Buspirone, Ciprofloxacin, Copaxone [glatiramer], Erythromycin base, Hydroxyzine, Iron sucrose, Metoclopramide hcl, Pregabalin, Sulfamethoxazole-trimethoprim, and [id number] antimigraine agents    Review of Systems     Physical Exam     Last Recorded Vitals  Blood pressure (!) 142/93, pulse 82, temperature 36.1 °C (97 °F), temperature source Temporal, resp. rate 16, height 1.702 m (5' 7\"), weight 91.4 kg (201 lb 8 oz), SpO2 95 %.    Relevant Results             Assessment/Plan   Active Problems:  There are no active Hospital Problems.    Patient is here " for colonoscopy to evaluate diarrhea also has history of fatty liver disease.         I spent  minutes in the professional and overall care of this patient.      Sukh Engel MD

## 2023-10-11 NOTE — POST-PROCEDURE NOTE
2:03 PM  Pt tolerated PO snack well.  Reviewed discharge instructions, educated pt on anesthesia safety.   All pre-op belongings with the pt.    Waiting on pt's ride.

## 2023-10-12 ASSESSMENT — PAIN SCALES - GENERAL: PAINLEVEL_OUTOF10: 0 - NO PAIN

## 2023-10-18 LAB
LABORATORY COMMENT REPORT: NORMAL
PATH REPORT.COMMENTS IMP SPEC: NORMAL
PATH REPORT.FINAL DX SPEC: NORMAL
PATH REPORT.GROSS SPEC: NORMAL
PATH REPORT.TOTAL CANCER: NORMAL

## 2023-10-19 ENCOUNTER — TELEPHONE (OUTPATIENT)
Dept: GASTROENTEROLOGY | Facility: HOSPITAL | Age: 68
End: 2023-10-19
Payer: MEDICARE

## 2023-10-19 NOTE — TELEPHONE ENCOUNTER
I talked to the patient regarding biopsy result.  There was a small submucosal mass in the stomach I talked to the patient regarding biopsy result..  Biopsies were negative but it was over the overlying  There was a small submucosal mass in the stomach.  Mucosa and submucosa was not biopsied small bowel biopsies and colon biopsies and esophageal biopsies were negative  Biopsies were negative but it was over the overlying mucosa and submucosa was not biopsied small bowel biopsies and colon biopsies and esophageal biopsies were negative she could take dicyclomine she could take that she has had at home for control of diarrhea request follow-up in 2 months dicyclomine. that she has had at home for control of diarrhea requested follow-up in 2 months.

## 2023-11-14 ENCOUNTER — APPOINTMENT (OUTPATIENT)
Dept: PRIMARY CARE | Facility: CLINIC | Age: 68
End: 2023-11-14
Payer: MEDICARE

## 2023-11-28 ENCOUNTER — TELEPHONE (OUTPATIENT)
Dept: PRIMARY CARE | Facility: CLINIC | Age: 68
End: 2023-11-28

## 2023-12-08 ENCOUNTER — OFFICE VISIT (OUTPATIENT)
Dept: PRIMARY CARE | Facility: CLINIC | Age: 68
End: 2023-12-08
Payer: MEDICARE

## 2023-12-08 VITALS
WEIGHT: 200 LBS | SYSTOLIC BLOOD PRESSURE: 122 MMHG | HEART RATE: 94 BPM | BODY MASS INDEX: 31.39 KG/M2 | DIASTOLIC BLOOD PRESSURE: 70 MMHG | HEIGHT: 67 IN | OXYGEN SATURATION: 97 %

## 2023-12-08 DIAGNOSIS — F41.1 ANXIETY STATE: ICD-10-CM

## 2023-12-08 DIAGNOSIS — M54.2 NECK PAIN: Primary | ICD-10-CM

## 2023-12-08 DIAGNOSIS — R20.2 NUMBNESS AND TINGLING IN LEFT HAND: ICD-10-CM

## 2023-12-08 DIAGNOSIS — F51.01 PRIMARY INSOMNIA: ICD-10-CM

## 2023-12-08 DIAGNOSIS — Z51.81 MEDICATION MONITORING ENCOUNTER: ICD-10-CM

## 2023-12-08 DIAGNOSIS — R20.0 NUMBNESS AND TINGLING IN LEFT HAND: ICD-10-CM

## 2023-12-08 LAB
AMPHETAMINES UR QL SCN: NORMAL
BARBITURATES UR QL SCN: NORMAL
BZE UR QL SCN: NORMAL
CANNABINOIDS UR QL SCN: NORMAL
CREAT UR-MCNC: 179 MG/DL (ref 20–320)
PCP UR QL SCN: NORMAL

## 2023-12-08 PROCEDURE — 3078F DIAST BP <80 MM HG: CPT | Performed by: STUDENT IN AN ORGANIZED HEALTH CARE EDUCATION/TRAINING PROGRAM

## 2023-12-08 PROCEDURE — 99214 OFFICE O/P EST MOD 30 MIN: CPT | Performed by: STUDENT IN AN ORGANIZED HEALTH CARE EDUCATION/TRAINING PROGRAM

## 2023-12-08 PROCEDURE — 80307 DRUG TEST PRSMV CHEM ANLYZR: CPT

## 2023-12-08 PROCEDURE — 3074F SYST BP LT 130 MM HG: CPT | Performed by: STUDENT IN AN ORGANIZED HEALTH CARE EDUCATION/TRAINING PROGRAM

## 2023-12-08 PROCEDURE — 1126F AMNT PAIN NOTED NONE PRSNT: CPT | Performed by: STUDENT IN AN ORGANIZED HEALTH CARE EDUCATION/TRAINING PROGRAM

## 2023-12-08 PROCEDURE — 82570 ASSAY OF URINE CREATININE: CPT

## 2023-12-08 PROCEDURE — 80361 OPIATES 1 OR MORE: CPT | Performed by: STUDENT IN AN ORGANIZED HEALTH CARE EDUCATION/TRAINING PROGRAM

## 2023-12-08 PROCEDURE — 1036F TOBACCO NON-USER: CPT | Performed by: STUDENT IN AN ORGANIZED HEALTH CARE EDUCATION/TRAINING PROGRAM

## 2023-12-08 PROCEDURE — 1160F RVW MEDS BY RX/DR IN RCRD: CPT | Performed by: STUDENT IN AN ORGANIZED HEALTH CARE EDUCATION/TRAINING PROGRAM

## 2023-12-08 PROCEDURE — 1159F MED LIST DOCD IN RCRD: CPT | Performed by: STUDENT IN AN ORGANIZED HEALTH CARE EDUCATION/TRAINING PROGRAM

## 2023-12-08 RX ORDER — TRAZODONE HYDROCHLORIDE 100 MG/1
100-200 TABLET ORAL NIGHTLY
Qty: 180 TABLET | Refills: 2 | Status: SHIPPED | OUTPATIENT
Start: 2023-12-08

## 2023-12-08 RX ORDER — TIZANIDINE 4 MG/1
4 TABLET ORAL EVERY 6 HOURS PRN
Qty: 30 TABLET | Refills: 2 | Status: SHIPPED | OUTPATIENT
Start: 2023-12-08 | End: 2024-02-20 | Stop reason: SDUPTHER

## 2023-12-08 ASSESSMENT — ENCOUNTER SYMPTOMS
CONSTITUTIONAL NEGATIVE: 1
BACK PAIN: 1
CARDIOVASCULAR NEGATIVE: 1
RESPIRATORY NEGATIVE: 1
GASTROINTESTINAL NEGATIVE: 1
NUMBNESS: 1
SLEEP DISTURBANCE: 1
NERVOUS/ANXIOUS: 1
NECK STIFFNESS: 1
NECK PAIN: 1

## 2023-12-08 NOTE — PROGRESS NOTES
"Subjective   Patient ID: Ruth Ann Martinez is a 68 y.o. female who presents for Anxiety and Neck Pain (Painful, feels like it is crunching x3 weeks).    Twisted her neck to the side 3 weeks ago and felt a crunch. Has had left sided neck muscle tension and pain since.     Left hand tingling is new. Some weakness is chronic.     Heat and ice did not help. Has not tried anything other than her usually daily pain regimen or percocet and baclofen.     A lot of stress lately due to mothers mental health and family health issues. Sleep has been a little worse.     Using her clonazepam appropriately. Due for UDS.     Taking baclofen 3 time daily             Review of Systems   Constitutional: Negative.    HENT: Negative.     Eyes:  Negative for visual disturbance.   Respiratory: Negative.     Cardiovascular: Negative.    Gastrointestinal: Negative.    Musculoskeletal:  Positive for back pain, neck pain and neck stiffness.   Neurological:  Positive for numbness.   Psychiatric/Behavioral:  Positive for sleep disturbance. The patient is nervous/anxious.    All other systems reviewed and are negative.      Objective   /70 (BP Location: Right arm, Patient Position: Sitting)   Pulse 94   Ht 1.702 m (5' 7\")   Wt 90.7 kg (200 lb)   SpO2 97%   BMI 31.32 kg/m²     Physical Exam  Vitals reviewed.   Constitutional:       Appearance: Normal appearance.   HENT:      Head: Normocephalic.   Eyes:      Pupils: Pupils are equal, round, and reactive to light.   Neck:      Comments: Decreased rotational ROM to left  Tenderness and muscle tension increased over left paraspinal muscles  Cardiovascular:      Rate and Rhythm: Normal rate and regular rhythm.   Pulmonary:      Effort: Pulmonary effort is normal. No respiratory distress.      Breath sounds: Normal breath sounds. No wheezing or rhonchi.   Skin:     General: Skin is warm and dry.   Neurological:      General: No focal deficit present.      Mental Status: She is alert. "   Psychiatric:         Mood and Affect: Mood normal.         Behavior: Behavior normal.         Body mass index is 31.32 kg/m².      Current Outpatient Medications:     baclofen (Lioresal) 10 mg tablet, Take 1 tablet (10 mg) by mouth 3 times a day., Disp: , Rfl:     clonazePAM (KlonoPIN) 0.5 mg tablet, Take 2 tablets (1 mg) by mouth once daily as needed for anxiety., Disp: 15 tablet, Rfl: 2    gabapentin (Neurontin) 600 mg tablet, Take 2 tablets (1,200 mg) by mouth 2 times a day., Disp: , Rfl:     melatonin 5 mg tablet, Take 1-2 tablets (5-10 mg) by mouth as needed at bedtime., Disp: , Rfl:     ondansetron (Zofran) 4 mg tablet, Take 1 tablet (4 mg) by mouth every 12 hours if needed for nausea or vomiting., Disp: , Rfl:     oxyCODONE-acetaminophen (Percocet) 7.5-325 mg tablet, Take 1 tablet by mouth 3 times a day., Disp: , Rfl:     QUEtiapine (SEROquel) 100 mg tablet, Take 1 tablet (100 mg) by mouth 2 times a day as needed (FOR HEADACHE OR PAIN OR SLEEP. MAY INCREASE TO 2 TABLETS AT BEDTIME)., Disp: , Rfl:     sertraline (Zoloft) 100 mg tablet, Take 1 tablet (100 mg) by mouth once daily in the morning., Disp: 90 tablet, Rfl: 1    tiZANidine (Zanaflex) 4 mg tablet, Take 1 tablet (4 mg) by mouth every 6 hours if needed for muscle spasms for up to 10 days., Disp: 30 tablet, Rfl: 2    traZODone (Desyrel) 100 mg tablet, Take 1-2 tablets (100-200 mg) by mouth once daily at bedtime., Disp: 180 tablet, Rfl: 2      Assessment/Plan   Diagnoses and all orders for this visit:  Neck pain  Comments:  recommended heat, adding additional tylenol, cautioned about 3g or less  will also obtain xray  Orders:  -     XR cervical spine 2-3 views; Future  -     tiZANidine (Zanaflex) 4 mg tablet; Take 1 tablet (4 mg) by mouth every 6 hours if needed for muscle spasms for up to 10 days.  Numbness and tingling in left hand  -     XR cervical spine 2-3 views; Future  -     tiZANidine (Zanaflex) 4 mg tablet; Take 1 tablet (4 mg) by mouth every 6  hours if needed for muscle spasms for up to 10 days.  Primary insomnia  Comments:  trazodone refilled  Orders:  -     traZODone (Desyrel) 100 mg tablet; Take 1-2 tablets (100-200 mg) by mouth once daily at bedtime.  Medication monitoring encounter  Comments:  CSA up to date  UDS done today  using meds appropriately   OARRS reviewed  Orders:  -     Opiate/Opioid/Benzo Prescription Compliance  Anxiety state  Comments:  more acute stress recently       OARRS report reviewed for Ruth Ann Martinez today and is consistent with prescribed therapy.  We weighed the risks and benefit of this therapy and will continue with the current plan    Follow up in 3 months    Cookie Luciano, DO

## 2023-12-13 LAB
1OH-MIDAZOLAM UR CFM-MCNC: <25 NG/ML
6MAM UR CFM-MCNC: <25 NG/ML
7AMINOCLONAZEPAM UR CFM-MCNC: <25 NG/ML
A-OH ALPRAZ UR CFM-MCNC: <25 NG/ML
ALPRAZ UR CFM-MCNC: <25 NG/ML
CHLORDIAZEP UR CFM-MCNC: <25 NG/ML
CLONAZEPAM UR CFM-MCNC: <25 NG/ML
CODEINE UR CFM-MCNC: <50 NG/ML
DIAZEPAM UR CFM-MCNC: <25 NG/ML
EDDP UR CFM-MCNC: <25 NG/ML
FENTANYL UR CFM-MCNC: <2.5 NG/ML
HYDROCODONE CTO UR CFM-MCNC: <25 NG/ML
HYDROMORPHONE UR CFM-MCNC: <25 NG/ML
LORAZEPAM UR CFM-MCNC: <25 NG/ML
METHADONE UR CFM-MCNC: <25 NG/ML
MIDAZOLAM UR CFM-MCNC: <25 NG/ML
MORPHINE UR CFM-MCNC: <50 NG/ML
NORDIAZEPAM UR CFM-MCNC: <25 NG/ML
NORFENTANYL UR CFM-MCNC: <2.5 NG/ML
NORHYDROCODONE UR CFM-MCNC: <25 NG/ML
NOROXYCODONE UR CFM-MCNC: >1000 NG/ML
NORTRAMADOL UR-MCNC: <50 NG/ML
OXAZEPAM UR CFM-MCNC: <25 NG/ML
OXYCODONE UR CFM-MCNC: 391 NG/ML
OXYMORPHONE UR CFM-MCNC: 119 NG/ML
TEMAZEPAM UR CFM-MCNC: <25 NG/ML
TRAMADOL UR CFM-MCNC: <50 NG/ML
ZOLPIDEM UR CFM-MCNC: <25 NG/ML
ZOLPIDEM UR-MCNC: <25 NG/ML

## 2024-01-05 DIAGNOSIS — F41.9 ANXIETY: ICD-10-CM

## 2024-01-05 RX ORDER — CLONAZEPAM 0.5 MG/1
1 TABLET ORAL DAILY PRN
Qty: 15 TABLET | Refills: 2 | Status: SHIPPED | OUTPATIENT
Start: 2024-01-05 | End: 2024-07-03

## 2024-01-05 RX ORDER — CLONAZEPAM 0.5 MG/1
1 TABLET ORAL DAILY PRN
Qty: 15 TABLET | Refills: 2 | OUTPATIENT
Start: 2024-01-05 | End: 2024-07-03

## 2024-02-06 DIAGNOSIS — F41.9 ANXIETY: ICD-10-CM

## 2024-02-06 RX ORDER — SERTRALINE HYDROCHLORIDE 100 MG/1
100 TABLET, FILM COATED ORAL EVERY MORNING
Qty: 90 TABLET | Refills: 1 | Status: SHIPPED | OUTPATIENT
Start: 2024-02-06

## 2024-02-20 DIAGNOSIS — R20.0 NUMBNESS AND TINGLING IN LEFT HAND: ICD-10-CM

## 2024-02-20 DIAGNOSIS — R20.2 NUMBNESS AND TINGLING IN LEFT HAND: ICD-10-CM

## 2024-02-20 DIAGNOSIS — M54.2 NECK PAIN: ICD-10-CM

## 2024-02-20 RX ORDER — TIZANIDINE 4 MG/1
4 TABLET ORAL EVERY 6 HOURS PRN
Qty: 30 TABLET | Refills: 2 | Status: SHIPPED | OUTPATIENT
Start: 2024-02-20 | End: 2024-03-14

## 2024-02-20 NOTE — TELEPHONE ENCOUNTER
Patient asking for refill- She has an appointment next week for someone to manage neck pain.   They wont fill until she is seen

## 2024-05-03 ENCOUNTER — TELEPHONE (OUTPATIENT)
Dept: PRIMARY CARE | Facility: CLINIC | Age: 69
End: 2024-05-03
Payer: MEDICARE

## 2024-05-03 DIAGNOSIS — B83.9 WORMS IN STOOL: Primary | ICD-10-CM

## 2024-05-30 ENCOUNTER — OFFICE VISIT (OUTPATIENT)
Dept: NEUROLOGY | Facility: CLINIC | Age: 69
End: 2024-05-30
Payer: MEDICARE

## 2024-05-30 VITALS
RESPIRATION RATE: 16 BRPM | BODY MASS INDEX: 31.15 KG/M2 | HEIGHT: 66 IN | DIASTOLIC BLOOD PRESSURE: 78 MMHG | SYSTOLIC BLOOD PRESSURE: 122 MMHG | TEMPERATURE: 97.6 F | WEIGHT: 193.8 LBS | HEART RATE: 81 BPM

## 2024-05-30 DIAGNOSIS — R90.89 ABNORMAL BRAIN MRI: ICD-10-CM

## 2024-05-30 DIAGNOSIS — G81.94 ACUTE LEFT HEMIPARESIS (MULTI): Chronic | ICD-10-CM

## 2024-05-30 DIAGNOSIS — F32.A DEPRESSION, UNSPECIFIED DEPRESSION TYPE: ICD-10-CM

## 2024-05-30 DIAGNOSIS — G44.52 NEW DAILY PERSISTENT HEADACHE: ICD-10-CM

## 2024-05-30 DIAGNOSIS — F41.1 ANXIETY STATE: ICD-10-CM

## 2024-05-30 DIAGNOSIS — G95.9 CERVICAL MYELOPATHY (MULTI): ICD-10-CM

## 2024-05-30 DIAGNOSIS — G43.109 MIGRAINE WITH AURA AND WITHOUT STATUS MIGRAINOSUS, NOT INTRACTABLE: ICD-10-CM

## 2024-05-30 DIAGNOSIS — G25.81 RESTLESS LEGS SYNDROME: ICD-10-CM

## 2024-05-30 DIAGNOSIS — I67.1 CEREBRAL ANEURYSM, NONRUPTURED (HHS-HCC): ICD-10-CM

## 2024-05-30 DIAGNOSIS — I10 HYPERTENSION, UNSPECIFIED TYPE: Primary | ICD-10-CM

## 2024-05-30 DIAGNOSIS — E78.00 HYPERCHOLESTEROLEMIA: ICD-10-CM

## 2024-05-30 PROCEDURE — 99214 OFFICE O/P EST MOD 30 MIN: CPT | Performed by: PSYCHIATRY & NEUROLOGY

## 2024-05-30 PROCEDURE — 1160F RVW MEDS BY RX/DR IN RCRD: CPT | Performed by: PSYCHIATRY & NEUROLOGY

## 2024-05-30 PROCEDURE — 1036F TOBACCO NON-USER: CPT | Performed by: PSYCHIATRY & NEUROLOGY

## 2024-05-30 PROCEDURE — 3074F SYST BP LT 130 MM HG: CPT | Performed by: PSYCHIATRY & NEUROLOGY

## 2024-05-30 PROCEDURE — G2211 COMPLEX E/M VISIT ADD ON: HCPCS | Performed by: PSYCHIATRY & NEUROLOGY

## 2024-05-30 PROCEDURE — 3078F DIAST BP <80 MM HG: CPT | Performed by: PSYCHIATRY & NEUROLOGY

## 2024-05-30 PROCEDURE — 1159F MED LIST DOCD IN RCRD: CPT | Performed by: PSYCHIATRY & NEUROLOGY

## 2024-05-30 ASSESSMENT — ENCOUNTER SYMPTOMS
LOSS OF SENSATION IN FEET: 0
OCCASIONAL FEELINGS OF UNSTEADINESS: 0
DEPRESSION: 0
HEADACHES: 1

## 2024-05-30 NOTE — PROGRESS NOTES
Subjective     Ruth Ann Martinez 68 y.o.  HPI  The patient states that for the last several weeks she has had fairly severe left frontal headaches.  She rates them at approximately a 8 out of 10 in severity.  She states that the pain is sharp with both nausea and vomiting.  The patient has both photo and phonophobia noted.    The patient states that she did see neurosurgery at [facility] who stated that the aneurysm was nothing to worry about.    The patient states that she still has left-sided weakness but it is stable.      The patient is compliant with her baclofen, Klonopin, Neurontin, Zofran, Zoloft, Zanaflex and trazodone  Review of Systems   Neurological:  Positive for headaches.   All other systems reviewed and are negative.       Patient Active Problem List   Diagnosis    Abnormal brain MRI    Anxiety state    Arthritis    Carpal tunnel syndrome of right wrist    Cerebral aneurysm, nonruptured ([facility])    Cervical stenosis of spine    Cervical myelopathy (Multi)    Chest wall pain    Cough    Degenerative spondylolisthesis    Depression    Disc degeneration, lumbosacral    Dyspnea on exertion    Excessive daytime sleepiness    Fecal incontinence    Fullness of breast    Fibromyalgia    Esophagitis    Hair loss    Headache    Hemiplegic migraine    Herpes zoster    Hordeolum externum of right lower eyelid    Hypercholesterolemia    Hyperglycemia    Hyperhidrosis    Insomnia    Irritable bowel syndrome    Lumbago    Lumbar radiculopathy    Lumbar spondylosis    Memory loss    Migraines    Nausea and vomiting    Numbness of hand    Oligouria    ROSAMARIA (obstructive sleep apnea)    Osteoporosis    Other intervertebral disc displacement, lumbar region    Other intervertebral disc displacement, lumbosacral region    Postlaminectomy syndrome, lumbar    Restless legs syndrome    Snoring    Stroke (Multi)    Thoracic spinal stenosis    Thrombocytopenia (CMS-HCC)    TIA (transient ischemic attack)    Vitamin B12  deficiency    Vitamin D deficiency    Weight loss    URI, acute    Acute left hemiparesis (Multi)    Left-sided weakness    Anemia, iron deficiency    Anemia    Pancytopenia (Multi)    Carcinoma in situ of cervix uteri    Cervical cancer (Multi)    Seizure disorder (Multi)    Convulsions (Multi)    Diarrhea    Dysarthria    Fibromyositis    CHASIDY (generalized anxiety disorder)    Gait abnormality    HTN (hypertension)    Major depressive disorder, severe (Multi)    Multiple sclerosis (Multi)    Nonspecific abnormal results of liver function study    Optic neuritis    Palpitations    Spasmodic torticollis    Weakness    Flank pain        Past Medical History:   Diagnosis Date    Abdominal distension (gaseous) 06/11/2018    Abdominal bloating    Cellulitis of face 12/29/2016    Cellulitis, face    Encounter for screening for malignant neoplasm of cervix     Pap smear for cervical cancer screening    Impacted cerumen, bilateral 07/01/2016    Bilateral impacted cerumen    Impacted cerumen, left ear 01/26/2015    Left ear impacted cerumen    Ocular pain, left eye 12/22/2016    Eye pain, left    Other intervertebral disc displacement, lumbar region     Lumbar herniated disc    Personal history of diseases of the blood and blood-forming organs and certain disorders involving the immune mechanism 08/30/2019    History of thrombocytopenia    Personal history of malignant neoplasm of cervix uteri     History of malignant neoplasm of cervix uteri    Personal history of other diseases of the digestive system     History of hemorrhoids    Personal history of other diseases of the respiratory system 03/22/2016    History of bronchitis    Personal history of other diseases of the respiratory system 07/01/2016    History of sore throat    Personal history of other diseases of urinary system 06/15/2015    History of hematuria    Personal history of other drug therapy 10/03/2016    History of influenza vaccination    Personal history  of other medical treatment     H/O mammogram    Personal history of other medical treatment     Transfusion history    Personal history of other specified conditions 02/23/2018    History of abdominal pain    Personal history of other specified conditions 08/04/2015    History of dysuria    Personal history of other specified conditions 07/14/2014    History of fever    Personal history of other specified conditions 05/04/2018    History of confusion    Spinal stenosis, lumbar region without neurogenic claudication     Lumbar stenosis        Past Surgical History:   Procedure Laterality Date    COLONOSCOPY  08/26/2017    Complete Colonoscopy    CT ANGIO CORONARY ART WITH HEARTFLOW IF SCORE >30%  2/1/2021    CT HEART CORONARY ANGIOGRAM 2/1/2021 [facility]    KNEE ARTHROSCOPY W/ DEBRIDEMENT  09/09/2013    Arthroscopy Knee    LUMBAR FUSION  03/05/2014    Lumbar Vertebral Fusion    LUMBAR LAMINECTOMY  09/09/2013    Laminectomy Lumbar    MR HEAD ANGIO WO IV CONTRAST  9/10/2022    MR HEAD ANGIO WO IV CONTRAST 9/10/2022 [facility]    MR NECK ANGIO WO IV CONTRAST  9/10/2022    MR NECK ANGIO WO IV CONTRAST 9/10/2022 [facility]    OTHER SURGICAL HISTORY  09/09/2013    Nerve Block Transforaminal Epidural Lumbar    OTHER SURGICAL HISTORY  01/26/2015    Cervical Conization By Cold Knife    OTHER SURGICAL HISTORY  06/15/2015    Ovarian Cystectomy    ROTATOR CUFF REPAIR  09/09/2013    Rotator Cuff Repair    TOTAL KNEE ARTHROPLASTY  09/09/2013    Knee Replacement    TUBAL LIGATION  06/15/2015    Tubal Ligation        Social History     Socioeconomic History    Marital status:      Spouse name: Not on file    Number of children: Not on file    Years of education: Not on file    Highest education level: Not on file   Occupational History    Not on file   Tobacco Use    Smoking status: Former     Types: Cigarettes     Passive exposure: Past    Smokeless tobacco: Never   Vaping Use    Vaping status:  Never Used   Substance and Sexual Activity    Alcohol use: Never    Drug use: Never    Sexual activity: Not on file   Other Topics Concern    Not on file   Social History Narrative    Not on file     Social Determinants of Health     Financial Resource Strain: Not on File (10/15/2020)    Received from [employer]     Financial Resource Strain     Financial Resource Strain: 0   Food Insecurity: Not on File (10/15/2020)    Received from [facility]     Food Insecurity     Food: 0   Transportation Needs: Not on File (10/15/2020)    Received from [facility] [facility]     Transportation Needs     Transportation: 0   Physical Activity: Not on File (10/15/2020)    Received from [facility] [facility]     Physical Activity     Physical Activity: 0   Stress: Not on File (10/15/2020)    Received from [facility] [facility]     Stress     Stress: 0   Social Connections: Not on File (10/15/2020)    Received from [facility] [facility]     Social Connections     Social Connections and Isolation: 0   Intimate Partner Violence: Not on file   Housing Stability: Not on File (10/15/2020)    Received from [employer]     Housing Stability     Housin        Family History   Problem Relation Name Age of Onset    Pernicious anemia Father      Pancreatic cancer Father          Current Outpatient Medications   Medication Instructions    baclofen (LIORESAL) 10 mg, oral, 3 times daily    clonazePAM (KLONOPIN) 1 mg, oral, Daily PRN    gabapentin (NEURONTIN) 1,200 mg, oral, 2 times daily    melatonin 5-10 mg, oral, Nightly PRN    ondansetron (ZOFRAN) 4 mg, oral, Every 12 hours PRN    oxyCODONE-acetaminophen (Percocet) 7.5-325 mg tablet 1 tablet, oral, 3 times daily    QUEtiapine (SEROQUEL) 100 mg, oral, 2 times daily PRN    sertraline (ZOLOFT) 100 mg, oral, Every morning    tiZANidine (ZANAFLEX) 4 mg, oral, Every 6 hours PRN    traZODone (DESYREL) 100-200 mg, oral, Nightly        Allergies   Allergen Reactions    Buspirone Unknown    Ciprofloxacin Hallucinations    Copaxone [Glatiramer] Other     Injection site reaction   "   Erythromycin Base Diarrhea and Hallucinations    Hydroxyzine Unknown    Iron Sucrose Unknown    Metoclopramide Hcl Unknown    Pregabalin Swelling    Sulfamethoxazole-Trimethoprim Hallucinations    [id number] Antimigraine Agents Palpitations and Other     bradycardia        Objective  /78 (BP Location: Right arm)   Pulse 81   Temp 36.4 °C (97.6 °F)   Resp 16   Ht 1.676 m (5' 6\")   Wt 87.9 kg (193 lb 12.8 oz)   BMI 31.28 kg/m²    GENERAL APPEARANCE:  No distress, alert and cooperative.     MENTAL STATE:  Orientation was normal to time, place and person. Recent and remote memory was intact.  Attention span and concentration were normal. Language testing was normal for comprehension, repetition, expression, and naming. Calculation was intact. The patient could correctly interpret a picture, and copy a diagram. General fund of knowledge was intact. Mini-mental status examination was performed with no errors.     CRANIAL NERVES:  Cranial nerves were normal.      CN 2- Visual Acuity  OD: 20/20 (corrected)   OS: 20/20 (corrected); visual fields full to confrontation.      CN 3, 4, 6-  Pupils round, 4 mm in diameter, equally reactive to light. No ptosis. EOMs normal alignment, full range of movement, no nystagmus     CN 5- Facial sensation intact bilaterally. Normal corneal reflexes.      CN 7- Normal and symmetric facial strength. Nasolabial folds symmetric.     CN 8- Hearing intact to finger rub, whisper.      CN 9- Palate elevates symmetrically. Normal gag reflex.      CN 11- Normal strength of shoulder shrug and neck turning      CN 12- Tongue midline, with normal bulk and strength; no fasciculations.     MOTOR:  Motor exam was normal. Muscle bulk and tone were normal in both upper and lower extremities. Muscle strength was 5/5 in distal and proximal muscles in both upper and lower extremities.  The patient does have giveaway weakness in the left upper and lower extremity.  No fasciculations, tremor or " other abnormal movements were present.     GAIT: Station was stable with a normal base and negative Romberg sign. Gait was stable with a normal arm swing and speed. No ataxia, shuffling, steppage or waddling was noted. Tandem gait was intact. Postural reflexes were normal.     Assessment/Plan   The patient has been having a persistent headache over the last several weeks.  I did offer her treatment with a Medrol Dosepak but she is declining at this time.  I will start the patient on Ajovy at 225 mg subcu monthly.  I did warn her of the possibility of site reactions with this medicine.  The patient should continue all of her medicines and stroke risk factor modification.  The patient should continue pain medicines as needed for severe headaches.  The patient should follow-up with pain management as scheduled.  The patient needs to lose weight to her ideal body weight, avoid supine position, improve sleep hygiene and get at least 8 hours of sleep at night.  I discussed all these issues in detail with the patient and answered all of her questions.  The patient will call me in 1 month to let me know how she is doing.  I will follow-up with the patient in 6 months.

## 2024-05-30 NOTE — PATIENT INSTRUCTIONS
The patient has been having a persistent headache over the last several weeks.  I did offer her treatment with a Medrol Dosepak but she is declining at this time.  I will start the patient on Ajovy at 225 mg subcu monthly.  I did warn her of the possibility of site reactions with this medicine.  The patient should continue all of her medicines and stroke risk factor modification.  The patient should continue pain medicines as needed for severe headaches.  The patient should follow-up with pain management as scheduled.  The patient needs to lose weight to her ideal body weight, avoid supine position, improve sleep hygiene and get at least 8 hours of sleep at night.  I discussed all these issues in detail with the patient and answered all of her questions.  The patient will call me in 1 month to let me know how she is doing.  I will follow-up with the patient in 6 months.

## 2024-06-12 ENCOUNTER — HOSPITAL ENCOUNTER (EMERGENCY)
Facility: HOSPITAL | Age: 69
Discharge: HOME | End: 2024-06-12
Attending: STUDENT IN AN ORGANIZED HEALTH CARE EDUCATION/TRAINING PROGRAM
Payer: MEDICARE

## 2024-06-12 ENCOUNTER — APPOINTMENT (OUTPATIENT)
Dept: RADIOLOGY | Facility: HOSPITAL | Age: 69
End: 2024-06-12
Payer: MEDICARE

## 2024-06-12 ENCOUNTER — APPOINTMENT (OUTPATIENT)
Dept: CARDIOLOGY | Facility: HOSPITAL | Age: 69
End: 2024-06-12
Payer: MEDICARE

## 2024-06-12 VITALS
DIASTOLIC BLOOD PRESSURE: 91 MMHG | BODY MASS INDEX: 29.94 KG/M2 | HEART RATE: 62 BPM | RESPIRATION RATE: 18 BRPM | TEMPERATURE: 97.9 F | OXYGEN SATURATION: 95 % | WEIGHT: 186.29 LBS | HEIGHT: 66 IN | SYSTOLIC BLOOD PRESSURE: 194 MMHG

## 2024-06-12 DIAGNOSIS — G81.94 LEFT HEMIPLEGIA (MULTI): ICD-10-CM

## 2024-06-12 DIAGNOSIS — G44.52 NEW DAILY PERSISTENT HEADACHE: ICD-10-CM

## 2024-06-12 DIAGNOSIS — G43.809 OTHER MIGRAINE WITHOUT STATUS MIGRAINOSUS, NOT INTRACTABLE: Primary | ICD-10-CM

## 2024-06-12 LAB
ALBUMIN SERPL BCP-MCNC: 4.9 G/DL (ref 3.4–5)
ALP SERPL-CCNC: 88 U/L (ref 33–136)
ALT SERPL W P-5'-P-CCNC: 14 U/L (ref 7–45)
ANION GAP SERPL CALC-SCNC: 13 MMOL/L (ref 10–20)
APTT PPP: 27 SECONDS (ref 27–38)
AST SERPL W P-5'-P-CCNC: 18 U/L (ref 9–39)
BASOPHILS # BLD AUTO: 0.01 X10*3/UL (ref 0–0.1)
BASOPHILS NFR BLD AUTO: 0.2 %
BILIRUB SERPL-MCNC: 0.6 MG/DL (ref 0–1.2)
BUN SERPL-MCNC: 19 MG/DL (ref 6–23)
CALCIUM SERPL-MCNC: 9.7 MG/DL (ref 8.6–10.3)
CARDIAC TROPONIN I PNL SERPL HS: 3 NG/L (ref 0–13)
CHLORIDE SERPL-SCNC: 107 MMOL/L (ref 98–107)
CO2 SERPL-SCNC: 26 MMOL/L (ref 21–32)
CREAT SERPL-MCNC: 1 MG/DL (ref 0.5–1.05)
EGFRCR SERPLBLD CKD-EPI 2021: 62 ML/MIN/1.73M*2
EOSINOPHIL # BLD AUTO: 0.05 X10*3/UL (ref 0–0.7)
EOSINOPHIL NFR BLD AUTO: 1 %
ERYTHROCYTE [DISTWIDTH] IN BLOOD BY AUTOMATED COUNT: 12.4 % (ref 11.5–14.5)
GLUCOSE BLD MANUAL STRIP-MCNC: 134 MG/DL (ref 74–99)
GLUCOSE SERPL-MCNC: 129 MG/DL (ref 74–99)
HCT VFR BLD AUTO: 42.7 % (ref 36–46)
HGB BLD-MCNC: 15.1 G/DL (ref 12–16)
IMM GRANULOCYTES # BLD AUTO: 0.01 X10*3/UL (ref 0–0.7)
IMM GRANULOCYTES NFR BLD AUTO: 0.2 % (ref 0–0.9)
INR PPP: 1 (ref 0.9–1.1)
LYMPHOCYTES # BLD AUTO: 1.47 X10*3/UL (ref 1.2–4.8)
LYMPHOCYTES NFR BLD AUTO: 30.2 %
MCH RBC QN AUTO: 31.4 PG (ref 26–34)
MCHC RBC AUTO-ENTMCNC: 35.4 G/DL (ref 32–36)
MCV RBC AUTO: 89 FL (ref 80–100)
MONOCYTES # BLD AUTO: 0.25 X10*3/UL (ref 0.1–1)
MONOCYTES NFR BLD AUTO: 5.1 %
NEUTROPHILS # BLD AUTO: 3.07 X10*3/UL (ref 1.2–7.7)
NEUTROPHILS NFR BLD AUTO: 63.3 %
NRBC BLD-RTO: 0 /100 WBCS (ref 0–0)
PLATELET # BLD AUTO: 132 X10*3/UL (ref 150–450)
POCT GLUCOSE: 134 MG/DL (ref 74–99)
POTASSIUM SERPL-SCNC: 4.4 MMOL/L (ref 3.5–5.3)
PROT SERPL-MCNC: 7.4 G/DL (ref 6.4–8.2)
PROTHROMBIN TIME: 11.3 SECONDS (ref 9.8–12.8)
RBC # BLD AUTO: 4.81 X10*6/UL (ref 4–5.2)
SODIUM SERPL-SCNC: 142 MMOL/L (ref 136–145)
WBC # BLD AUTO: 4.9 X10*3/UL (ref 4.4–11.3)

## 2024-06-12 PROCEDURE — 70450 CT HEAD/BRAIN W/O DYE: CPT

## 2024-06-12 PROCEDURE — 70498 CT ANGIOGRAPHY NECK: CPT

## 2024-06-12 PROCEDURE — 82947 ASSAY GLUCOSE BLOOD QUANT: CPT

## 2024-06-12 PROCEDURE — 2500000004 HC RX 250 GENERAL PHARMACY W/ HCPCS (ALT 636 FOR OP/ED): Performed by: STUDENT IN AN ORGANIZED HEALTH CARE EDUCATION/TRAINING PROGRAM

## 2024-06-12 PROCEDURE — 99285 EMERGENCY DEPT VISIT HI MDM: CPT | Mod: 25

## 2024-06-12 PROCEDURE — 80053 COMPREHEN METABOLIC PANEL: CPT | Performed by: STUDENT IN AN ORGANIZED HEALTH CARE EDUCATION/TRAINING PROGRAM

## 2024-06-12 PROCEDURE — 85025 COMPLETE CBC W/AUTO DIFF WBC: CPT | Performed by: STUDENT IN AN ORGANIZED HEALTH CARE EDUCATION/TRAINING PROGRAM

## 2024-06-12 PROCEDURE — 85610 PROTHROMBIN TIME: CPT | Performed by: STUDENT IN AN ORGANIZED HEALTH CARE EDUCATION/TRAINING PROGRAM

## 2024-06-12 PROCEDURE — 99291 CRITICAL CARE FIRST HOUR: CPT | Performed by: STUDENT IN AN ORGANIZED HEALTH CARE EDUCATION/TRAINING PROGRAM

## 2024-06-12 PROCEDURE — 96374 THER/PROPH/DIAG INJ IV PUSH: CPT

## 2024-06-12 PROCEDURE — 2550000001 HC RX 255 CONTRASTS: Performed by: STUDENT IN AN ORGANIZED HEALTH CARE EDUCATION/TRAINING PROGRAM

## 2024-06-12 PROCEDURE — 93005 ELECTROCARDIOGRAM TRACING: CPT

## 2024-06-12 PROCEDURE — 84484 ASSAY OF TROPONIN QUANT: CPT | Performed by: STUDENT IN AN ORGANIZED HEALTH CARE EDUCATION/TRAINING PROGRAM

## 2024-06-12 PROCEDURE — 70450 CT HEAD/BRAIN W/O DYE: CPT | Performed by: RADIOLOGY

## 2024-06-12 PROCEDURE — 85730 THROMBOPLASTIN TIME PARTIAL: CPT | Performed by: STUDENT IN AN ORGANIZED HEALTH CARE EDUCATION/TRAINING PROGRAM

## 2024-06-12 PROCEDURE — 70496 CT ANGIOGRAPHY HEAD: CPT | Performed by: RADIOLOGY

## 2024-06-12 PROCEDURE — 70498 CT ANGIOGRAPHY NECK: CPT | Performed by: RADIOLOGY

## 2024-06-12 PROCEDURE — 36415 COLL VENOUS BLD VENIPUNCTURE: CPT | Performed by: STUDENT IN AN ORGANIZED HEALTH CARE EDUCATION/TRAINING PROGRAM

## 2024-06-12 PROCEDURE — 96375 TX/PRO/DX INJ NEW DRUG ADDON: CPT

## 2024-06-12 RX ORDER — ONDANSETRON HYDROCHLORIDE 2 MG/ML
4 INJECTION, SOLUTION INTRAVENOUS ONCE
Status: COMPLETED | OUTPATIENT
Start: 2024-06-12 | End: 2024-06-12

## 2024-06-12 RX ORDER — DIPHENHYDRAMINE HYDROCHLORIDE 50 MG/ML
12.5 INJECTION INTRAMUSCULAR; INTRAVENOUS ONCE
Status: COMPLETED | OUTPATIENT
Start: 2024-06-12 | End: 2024-06-12

## 2024-06-12 RX ORDER — PROCHLORPERAZINE EDISYLATE 5 MG/ML
10 INJECTION INTRAMUSCULAR; INTRAVENOUS ONCE
Status: COMPLETED | OUTPATIENT
Start: 2024-06-12 | End: 2024-06-12

## 2024-06-12 RX ORDER — ACETAMINOPHEN 325 MG/1
975 TABLET ORAL ONCE
Status: COMPLETED | OUTPATIENT
Start: 2024-06-12 | End: 2024-06-12

## 2024-06-12 ASSESSMENT — LIFESTYLE VARIABLES
HAVE YOU EVER FELT YOU SHOULD CUT DOWN ON YOUR DRINKING: NO
TOTAL SCORE: 0
EVER FELT BAD OR GUILTY ABOUT YOUR DRINKING: NO
EVER HAD A DRINK FIRST THING IN THE MORNING TO STEADY YOUR NERVES TO GET RID OF A HANGOVER: NO
HAVE PEOPLE ANNOYED YOU BY CRITICIZING YOUR DRINKING: NO

## 2024-06-12 ASSESSMENT — COLUMBIA-SUICIDE SEVERITY RATING SCALE - C-SSRS
6. HAVE YOU EVER DONE ANYTHING, STARTED TO DO ANYTHING, OR PREPARED TO DO ANYTHING TO END YOUR LIFE?: NO
2. HAVE YOU ACTUALLY HAD ANY THOUGHTS OF KILLING YOURSELF?: NO
1. IN THE PAST MONTH, HAVE YOU WISHED YOU WERE DEAD OR WISHED YOU COULD GO TO SLEEP AND NOT WAKE UP?: NO

## 2024-06-12 ASSESSMENT — PAIN DESCRIPTION - LOCATION: LOCATION: HEAD

## 2024-06-12 ASSESSMENT — PAIN - FUNCTIONAL ASSESSMENT
PAIN_FUNCTIONAL_ASSESSMENT: 0-10
PAIN_FUNCTIONAL_ASSESSMENT: 0-10

## 2024-06-12 ASSESSMENT — PAIN SCALES - GENERAL
PAINLEVEL_OUTOF10: 0 - NO PAIN
PAINLEVEL_OUTOF10: 7

## 2024-06-12 NOTE — ED PROVIDER NOTES
History/Exam limitations: none.   Additional history was obtained from patient.    HPI:       Ruth Ann Martinez is a 68 y.o. with a history of intracranial aneurysm, complex migraines with hemiaplasia, fibromyalgia presenting to the emergency department today due to concerns for stroke.  Patient states that her symptoms started at 1700.  She states that she is having a headache, this feels different than her migraines.  She states that she does have a history of hemiplegia with some of her migraines however this has not happened in several years and is insistent that today her symptoms feel different.  She is awake, alert and oriented however does have some dysarthria.  She denies any recent traumas or any other concerns at this time..       Last Known Well Time: 1700        ------------------------------------------------------------------------------------------------------------------------------------------     Physical Exam:    ED Triage Vitals   Temp Pulse Resp BP   -- -- -- --      SpO2 Temp src Heart Rate Source Patient Position   -- -- -- --      BP Location FiO2 (%)     -- --          Gen: Not in acute distress  Head/Neck: NCAT  Eyes: Anicteric sclerae, noninjected conjunctivae  Nose: No rhinorrhea  Mouth:  MMM  Heart: Regular rate and rhythm w/ no murmurs, rubs, gallops  Lungs: CTAB w/o wheezes, rales, rhonchi, no increased work of breathing  Abdomen: Soft, NT/ND  Musculoskeletal: No deformities  Extremities: No edema.  Neurologic: Please see below for NIHSS  Skin: No rashes noted  Psychological: Calm        Interval: Baseline  Time: 5:44 PM  Person Administering Scale: Magda Davis MD     1a  Level of consciousness: 0=alert; keenly responsive   1b. LOC questions:  0=Performs both tasks correctly   1c. LOC commands: 0=Performs both tasks correctly   2.  Best Gaze: 0=normal   3. Visual: 0=No visual loss   4. Facial Palsy: 2=Partial paralysis (total or near total paralysis of the lower face)   5a.  Motor left arm: 1=Drift, limb holds 90 (or 45) degrees but drifts down before full 10 seconds: does not hit bed   5b.  Motor right arm: 0=No drift, limb holds 90 (or 45) degrees for full 10 seconds   6a. motor left le=Some effort against gravity, limb cannot get to or maintain (if cured) 90 (or 45) degrees, drifts down to bed, but has some effort against gravity   6b  Motor right le=No drift, limb holds 90 (or 45) degrees for full 10 seconds   7. Limb Ataxia: 0=Absent   8.  Sensory: 1=Mild to moderate sensory loss; patient feels pinprick is less sharp or is dull on the affected side; there is a loss of superficial pain with pinprick but patient is aware She is being touched   9. Best Language:  0=No aphasia, normal   10. Dysarthria: 1=Mild to moderate, patient slurs at least some words and at worst, can be understood with some difficulty   11. Extinction and Inattention: 0=No abnormality     Total:   7         ROHAN: ROHAN: Positive     ------------------------------------------------------------------------------------------------------------------------------------------     Medical Decision Making:   The patient is a 68-year-old woman with a past medical history of complex migraines with hemiaplasia, intracranial aneurysm, fibromyalgia, presenting to the emergency department today due to concerns for a stroke.  Patient's last known well was 1700.  She initially presented with an NIH of 7, Van positive given that she did have some left-sided facial droop, left arm droop, and left leg weakness.  Patient also had some dysarthria however otherwise was well-appearing.  She did endorse a headache at that time.  She states that this feels different than her regular migraines.  She also states that she has not had a migraine with hemiplegia and several years.  There was concern for a stroke.  As such, patient was taken urgently to CT for CT Noncon and CT angio head and neck.  CTs did not show any evidence of acute  stroke, did have some. This irregularity near the right M2 inferior branch that was similar to previous as well as a outpouching from the proximal right cervical ICA that is new.  Given the new changes in the cervical ICA, did consult vascular surgery.  They reviewed the images and they are not concerned that this would be the cause of the patient's symptoms.  Patient was given a headache cocktail including Benadryl, Zofran, Tylenol, Compazine.  On reassessment, her symptoms had completely resolved and she was no longer having a headache or any neurologic abnormalities.  As such, this is consistent with a migraine versus a TIA, however more likely to be migraine given her history.  Encouraged the patient to follow-up with neurology for further management following this, she was stable for discharge with return precautions.  She is amenable to this plan.      ED Course as of 06/14/24 1651 Wed Jun 12, 2024   1744 LKW 1700.  Does have a history of complex migraines with hemiplegia but notes that this feels different. [AB]   1755 No TKN per Neuro [AB]   2027 On repeat evaluation, patient feels like she is at her baseline.  Given her history of migraines and hemiplegic, I do think that her clinical picture is most consistent with this.  However, we did discuss how we cannot completely rule out other pathology, such as CVA without MRI.  Patient very much would not like to be admitted to the hospital. [AB]      ED Course User Index  [AB] Hema Finley MD         Diagnoses as of 06/14/24 1651   Other migraine without status migrainosus, not intractable   Left hemiplegia (Multi)        EKG interpreted by myself: EKG shows a normal rate and rhythm, normal axis, normal intervals. And normal ST and T wave pattern with no evidence of acute ischemia or other acute findings similar to previous from 5/23/2024     Independent Interpretation of Studies: I independently interpreted the CT head and see No obvious evidence of  intracranial hemorrhage and No obvious evidence of skull fracture    Chronic Medical Conditions Significantly Affecting Care: none    Social Determinants of Health Significantly Affecting Care:  None     External Records Reviewed: I reviewed recent and relevant outside records including: Discharge summaries from last hospitalization in July 2023     Discussion of Management with Other Providers:   I discussed the patient/results with:  neurology who agreed with this plan      IV Thrombolysis IV Thrombolysis Checklist        IV Thrombolysis Given: No; Thrombolysis contraindication reason: Working diagnosis is NOT a suspected ischemic stroke, Neurologic signs have spontaneously resolved , and Other history of intracranial aneurysm        Procedure  Critical Care    Performed by: Magda Davis MD  Authorized by: Hema Finley MD    Critical care provider statement:     Critical care time (minutes):  31    Critical care time was exclusive of:  Separately billable procedures and treating other patients and teaching time    Critical care was necessary to treat or prevent imminent or life-threatening deterioration of the following conditions:  CNS failure or compromise    Critical care was time spent personally by me on the following activities:  Blood draw for specimens, ordering and performing treatments and interventions, development of treatment plan with patient or surrogate, ordering and review of laboratory studies, ordering and review of radiographic studies, discussions with consultants, evaluation of patient's response to treatment, re-evaluation of patient's condition and examination of patient            Magda Davis MD  Resident  06/14/24 2473

## 2024-06-13 LAB
ATRIAL RATE: 64 BPM
P AXIS: 72 DEGREES
P OFFSET: 201 MS
P ONSET: 139 MS
PR INTERVAL: 170 MS
Q ONSET: 224 MS
QRS COUNT: 10 BEATS
QRS DURATION: 82 MS
QT INTERVAL: 420 MS
QTC CALCULATION(BAZETT): 433 MS
QTC FREDERICIA: 429 MS
R AXIS: 30 DEGREES
T AXIS: 66 DEGREES
T OFFSET: 434 MS
VENTRICULAR RATE: 64 BPM

## 2024-06-13 NOTE — DISCHARGE INSTRUCTIONS
You are seen in the emergency department for headache with weakness to the left side of your body.  We think that this may be consistent with your known complex migraines with hemiplegia.  However, as we discussed the emergency department, this could be secondary to brain pathology, such as a stroke/CVA.  We did offer admission to see the neurologist as well as another picture of your brain, called an MRI.  You chose to follow-up as an outpatient.  Please return to the emergency department for symptoms recur or if you have any other concerns.    You will need to follow-up with the vascular surgeon for the following findings on your CT:    Small medially projecting outpouching arising from the proximal right  cervical ICA, not visualized on previous CTA 05/23/2023. Differential  includes small pseudoaneurysm versus ulcerated plaque.

## 2024-07-14 ENCOUNTER — APPOINTMENT (OUTPATIENT)
Dept: RADIOLOGY | Facility: HOSPITAL | Age: 69
End: 2024-07-14
Payer: MEDICARE

## 2024-07-14 ENCOUNTER — HOSPITAL ENCOUNTER (EMERGENCY)
Facility: HOSPITAL | Age: 69
Discharge: HOME | End: 2024-07-14
Attending: STUDENT IN AN ORGANIZED HEALTH CARE EDUCATION/TRAINING PROGRAM
Payer: MEDICARE

## 2024-07-14 VITALS
WEIGHT: 195 LBS | HEIGHT: 66 IN | RESPIRATION RATE: 18 BRPM | OXYGEN SATURATION: 98 % | HEART RATE: 76 BPM | TEMPERATURE: 96.8 F | DIASTOLIC BLOOD PRESSURE: 73 MMHG | SYSTOLIC BLOOD PRESSURE: 144 MMHG | BODY MASS INDEX: 31.34 KG/M2

## 2024-07-14 DIAGNOSIS — R10.30 LOWER ABDOMINAL PAIN: ICD-10-CM

## 2024-07-14 DIAGNOSIS — R10.9 FLANK PAIN: Primary | ICD-10-CM

## 2024-07-14 LAB
ALBUMIN SERPL BCP-MCNC: 4.6 G/DL (ref 3.4–5)
ALP SERPL-CCNC: 72 U/L (ref 33–136)
ALT SERPL W P-5'-P-CCNC: 15 U/L (ref 7–45)
AMORPH CRY #/AREA UR COMP ASSIST: NORMAL /HPF
ANION GAP SERPL CALC-SCNC: 12 MMOL/L (ref 10–20)
APPEARANCE UR: CLEAR
AST SERPL W P-5'-P-CCNC: 20 U/L (ref 9–39)
BASOPHILS # BLD AUTO: 0.01 X10*3/UL (ref 0–0.1)
BASOPHILS NFR BLD AUTO: 0.2 %
BILIRUB SERPL-MCNC: 0.5 MG/DL (ref 0–1.2)
BILIRUB UR STRIP.AUTO-MCNC: NEGATIVE MG/DL
BUN SERPL-MCNC: 19 MG/DL (ref 6–23)
CALCIUM SERPL-MCNC: 9.6 MG/DL (ref 8.6–10.3)
CHLORIDE SERPL-SCNC: 108 MMOL/L (ref 98–107)
CO2 SERPL-SCNC: 25 MMOL/L (ref 21–32)
COLOR UR: YELLOW
CREAT SERPL-MCNC: 0.98 MG/DL (ref 0.5–1.05)
EGFRCR SERPLBLD CKD-EPI 2021: 63 ML/MIN/1.73M*2
EOSINOPHIL # BLD AUTO: 0.05 X10*3/UL (ref 0–0.7)
EOSINOPHIL NFR BLD AUTO: 1.1 %
ERYTHROCYTE [DISTWIDTH] IN BLOOD BY AUTOMATED COUNT: 12.4 % (ref 11.5–14.5)
GLUCOSE SERPL-MCNC: 84 MG/DL (ref 74–99)
GLUCOSE UR STRIP.AUTO-MCNC: NORMAL MG/DL
HCT VFR BLD AUTO: 40.9 % (ref 36–46)
HGB BLD-MCNC: 14.3 G/DL (ref 12–16)
IMM GRANULOCYTES # BLD AUTO: 0.01 X10*3/UL (ref 0–0.7)
IMM GRANULOCYTES NFR BLD AUTO: 0.2 % (ref 0–0.9)
KETONES UR STRIP.AUTO-MCNC: NEGATIVE MG/DL
LEUKOCYTE ESTERASE UR QL STRIP.AUTO: NEGATIVE
LYMPHOCYTES # BLD AUTO: 1.29 X10*3/UL (ref 1.2–4.8)
LYMPHOCYTES NFR BLD AUTO: 29.7 %
MAGNESIUM SERPL-MCNC: 1.98 MG/DL (ref 1.6–2.4)
MCH RBC QN AUTO: 31.4 PG (ref 26–34)
MCHC RBC AUTO-ENTMCNC: 35 G/DL (ref 32–36)
MCV RBC AUTO: 90 FL (ref 80–100)
MONOCYTES # BLD AUTO: 0.26 X10*3/UL (ref 0.1–1)
MONOCYTES NFR BLD AUTO: 6 %
MUCOUS THREADS #/AREA URNS AUTO: NORMAL /LPF
NEUTROPHILS # BLD AUTO: 2.73 X10*3/UL (ref 1.2–7.7)
NEUTROPHILS NFR BLD AUTO: 62.8 %
NITRITE UR QL STRIP.AUTO: NEGATIVE
NRBC BLD-RTO: 0 /100 WBCS (ref 0–0)
PH UR STRIP.AUTO: 6.5 [PH]
PLATELET # BLD AUTO: 106 X10*3/UL (ref 150–450)
POTASSIUM SERPL-SCNC: 4.1 MMOL/L (ref 3.5–5.3)
PROT SERPL-MCNC: 7.4 G/DL (ref 6.4–8.2)
PROT UR STRIP.AUTO-MCNC: NORMAL MG/DL
RBC # BLD AUTO: 4.55 X10*6/UL (ref 4–5.2)
RBC # UR STRIP.AUTO: NEGATIVE /UL
RBC #/AREA URNS AUTO: NORMAL /HPF
SODIUM SERPL-SCNC: 141 MMOL/L (ref 136–145)
SP GR UR STRIP.AUTO: 1.02
SQUAMOUS #/AREA URNS AUTO: NORMAL /HPF
UROBILINOGEN UR STRIP.AUTO-MCNC: NORMAL MG/DL
WBC # BLD AUTO: 4.4 X10*3/UL (ref 4.4–11.3)
WBC #/AREA URNS AUTO: NORMAL /HPF

## 2024-07-14 PROCEDURE — 96374 THER/PROPH/DIAG INJ IV PUSH: CPT

## 2024-07-14 PROCEDURE — 2500000004 HC RX 250 GENERAL PHARMACY W/ HCPCS (ALT 636 FOR OP/ED)

## 2024-07-14 PROCEDURE — 85025 COMPLETE CBC W/AUTO DIFF WBC: CPT

## 2024-07-14 PROCEDURE — 80053 COMPREHEN METABOLIC PANEL: CPT

## 2024-07-14 PROCEDURE — 96376 TX/PRO/DX INJ SAME DRUG ADON: CPT

## 2024-07-14 PROCEDURE — 74177 CT ABD & PELVIS W/CONTRAST: CPT

## 2024-07-14 PROCEDURE — 99284 EMERGENCY DEPT VISIT MOD MDM: CPT | Mod: 25

## 2024-07-14 PROCEDURE — 83735 ASSAY OF MAGNESIUM: CPT

## 2024-07-14 PROCEDURE — 36415 COLL VENOUS BLD VENIPUNCTURE: CPT

## 2024-07-14 PROCEDURE — 2500000005 HC RX 250 GENERAL PHARMACY W/O HCPCS

## 2024-07-14 PROCEDURE — 96375 TX/PRO/DX INJ NEW DRUG ADDON: CPT

## 2024-07-14 PROCEDURE — 2550000001 HC RX 255 CONTRASTS: Performed by: STUDENT IN AN ORGANIZED HEALTH CARE EDUCATION/TRAINING PROGRAM

## 2024-07-14 PROCEDURE — 81001 URINALYSIS AUTO W/SCOPE: CPT

## 2024-07-14 PROCEDURE — 74177 CT ABD & PELVIS W/CONTRAST: CPT | Performed by: RADIOLOGY

## 2024-07-14 RX ORDER — LIDOCAINE 560 MG/1
1 PATCH PERCUTANEOUS; TOPICAL; TRANSDERMAL DAILY
Status: DISCONTINUED | OUTPATIENT
Start: 2024-07-14 | End: 2024-07-14 | Stop reason: HOSPADM

## 2024-07-14 RX ORDER — DICYCLOMINE HYDROCHLORIDE 20 MG/1
20 TABLET ORAL
Qty: 30 TABLET | Refills: 0 | Status: SHIPPED | OUTPATIENT
Start: 2024-07-14

## 2024-07-14 RX ORDER — METHOCARBAMOL 500 MG/1
500 TABLET, FILM COATED ORAL 4 TIMES DAILY
Qty: 40 TABLET | Refills: 0 | Status: SHIPPED | OUTPATIENT
Start: 2024-07-14 | End: 2024-07-24

## 2024-07-14 RX ORDER — METHOCARBAMOL 100 MG/ML
500 INJECTION, SOLUTION INTRAMUSCULAR; INTRAVENOUS ONCE
Status: COMPLETED | OUTPATIENT
Start: 2024-07-14 | End: 2024-07-14

## 2024-07-14 RX ORDER — ONDANSETRON HYDROCHLORIDE 2 MG/ML
4 INJECTION, SOLUTION INTRAVENOUS ONCE
Status: COMPLETED | OUTPATIENT
Start: 2024-07-14 | End: 2024-07-14

## 2024-07-14 RX ORDER — LIDOCAINE 50 MG/G
1 PATCH TOPICAL DAILY
Qty: 15 PATCH | Refills: 0 | Status: SHIPPED | OUTPATIENT
Start: 2024-07-14

## 2024-07-14 ASSESSMENT — COLUMBIA-SUICIDE SEVERITY RATING SCALE - C-SSRS
1. IN THE PAST MONTH, HAVE YOU WISHED YOU WERE DEAD OR WISHED YOU COULD GO TO SLEEP AND NOT WAKE UP?: NO
2. HAVE YOU ACTUALLY HAD ANY THOUGHTS OF KILLING YOURSELF?: NO
6. HAVE YOU EVER DONE ANYTHING, STARTED TO DO ANYTHING, OR PREPARED TO DO ANYTHING TO END YOUR LIFE?: NO

## 2024-07-14 ASSESSMENT — LIFESTYLE VARIABLES
HAVE YOU EVER FELT YOU SHOULD CUT DOWN ON YOUR DRINKING: NO
HAVE PEOPLE ANNOYED YOU BY CRITICIZING YOUR DRINKING: NO
TOTAL SCORE: 0
EVER FELT BAD OR GUILTY ABOUT YOUR DRINKING: NO
EVER HAD A DRINK FIRST THING IN THE MORNING TO STEADY YOUR NERVES TO GET RID OF A HANGOVER: NO

## 2024-07-14 ASSESSMENT — PAIN SCALES - GENERAL: PAINLEVEL_OUTOF10: 6

## 2024-07-14 NOTE — ED PROVIDER NOTES
Emergency Department Provider Note        History of Present Illness     History provided by: Patient  Limitations to History: None  External Records Reviewed with Brief Summary: Discharge Summary from 7/21/2023 which showed admission for diverticulitis, received Cipro, Flagyl, eventually discharged home with GI follow-up.  and Outpatient progress note from ED visit 6/12/2024 which showed likely TIA, refused admission for MRI and further workup.    HPI:  Ruth Ann Martinez is a 69 y.o. female with history of intracranial aneurysm, complex migraines with hemiaplasia, fibromyalgia, diverticulitis presented to the emergency department with left-sided flank pain that started on Friday.  She woke up this morning, and felt like her symptoms have worsened.  Now presenting with diffuse malaise, and right lower quadrant pain.  She denies any urinary symptoms, no hematuria, dysuria, increased frequency or urgency.  She declines any fevers, chest pain, shortness of breath, or any diarrhea.  Patient states she does have prior history of diverticulitis, states that her right lower quadrant pain feels similar to that, however the character is different.  Denies any prior history of nephrolithiasis.      Physical Exam   Triage vitals:  T 36 °C (96.8 °F)  HR 87  /73  RR 18  O2 97 % None (Room air)    GEN:  A&Ox3, no acute distress, appears uncomfortable. Conversational and appropriate.    HEENT: Normocephalic, atraumatic. Conjunctiva pink with no redness or exudates. Hearing grossly intact. Moist mucous membranes.  CARDIO: Normal rate and regular rhythm. Normal S1, S2  without murmurs, rubs, or gallops.   PULM: Clear to auscultation bilaterally. No rales, rhonchi, or wheezes. No accessory muscle use or stridor.  GI: Soft, tenderness in bilateral lower quadrants, left > right, in addition to left-sided flank tenderness., non-distended. No rebound tenderness or guarding.   SKIN: Warm and dry, no rashes, lesions, petechiae,  or purpura.  MSK: ROM intact in all 4 extremities without contractures or pain. No peripheral edema, contusions, or wounds. 2+ pulses in bilateral lower extremities which are warm and well-perfused.   NEURO: No focal findings identified. No confusion or gross mental status changes.    Medical Decision Making & ED Course   Medical Decision Makin y.o. female  with history of intracranial aneurysm, complex migraines with hemiaplasia, fibromyalgia, diverticulitis who presented with left-sided flank pain and bilateral lower quadrant pain worse on the left, concerning for nephrolithiasis.  However, CT of the abdomen pelvis did not show anything acute to suggest intra-abdominal pathology, along with negative urinalysis and urinary that was overall unremarkable without any leukocytosis.  I discussed these findings with patient, and unclear what her pain may be from, however I have a low suspicion for aortic occlusion, appendicitis, diverticulitis, nephrolithiasis, or pyelonephritis.  Patient's pain was little improved while here, will give prescriptions for Robaxin, lidocaine patches and Bentyl.  Discussed follow-up with gynecology and gastroenterology as needed, in addition to return to the emergency department if she has any additional symptoms or no improvement of her current symptoms.  Patient is comfortable with this plan, advised to follow-up as needed, discharged in stable condition.     ----      Differential diagnoses considered include but are not limited to: Nephrolithiasis with or without urinary obstruction or cystitis, pyelonephritis, acute diverticulitis, appendicitis, vascular occlusion.     Social Determinants of Health which Significantly Impact Care: None identified     EKG Independent Interpretation: EKG not obtained    Independent Result Review and Interpretation: Relevant laboratory and radiographic results were reviewed and independently interpreted by myself.  As necessary, they are commented  on in the ED Course.    Chronic conditions affecting the patient's care: As documented above in MDM    The patient was discussed with the following consultants/services: GI regarding her lower pain and Obstetrics and Gynecology regarding her lower abdominal pain    Care Considerations: Considered prescribing opiates, but chose not to because patient has Percocet at home    ED Course:  Diagnoses as of 07/14/24 1904   Flank pain   Lower abdominal pain     Disposition   As a result of the work-up, the patient was discharged home.  she was informed of her diagnosis and instructed to come back with any concerns or worsening of condition.  she and was agreeable to the plan as discussed above.  she was given the opportunity to ask questions.  All of the patient's questions were answered.    Procedures   Procedures    Patient seen and discussed with ED attending physician.    Marck Clark DO  Emergency Medicine       Marck Clark DO  Resident  07/14/24 1911

## 2024-07-14 NOTE — DISCHARGE INSTRUCTIONS
Please return to the emergency department, should you have any worsening symptoms, are unable to get an appointment with your primary care physician within the discussed time frame, or have any further concerns.     Please follow up with:  primary care phyisican as needed.  Please schedule follow-up with GI.    Please avoid driving until you know how medications may affect you.  They may increase your sleepiness which can result in motor vehicle accidents.  Please also use caution when climbing high ladders, and partaking in activities that require heavy lifting.    You may take ibuprofen or tylenol for pain. You may alternate ibuprofen and tylenol every 6 hours for better pain control.    Do not exceed 2400mg of ibuprofen or 4000mg of tylenol in a 24 hour period.

## 2024-07-15 ENCOUNTER — APPOINTMENT (OUTPATIENT)
Dept: VASCULAR SURGERY | Facility: CLINIC | Age: 69
End: 2024-07-15
Payer: MEDICARE

## 2024-07-15 LAB — HOLD SPECIMEN: NORMAL

## 2024-07-22 ENCOUNTER — APPOINTMENT (OUTPATIENT)
Dept: VASCULAR SURGERY | Facility: CLINIC | Age: 69
End: 2024-07-22
Payer: MEDICARE

## 2024-07-25 ENCOUNTER — APPOINTMENT (OUTPATIENT)
Dept: VASCULAR SURGERY | Facility: CLINIC | Age: 69
End: 2024-07-25
Payer: MEDICARE

## 2024-07-25 VITALS
WEIGHT: 190 LBS | BODY MASS INDEX: 30.53 KG/M2 | HEART RATE: 60 BPM | SYSTOLIC BLOOD PRESSURE: 138 MMHG | HEIGHT: 66 IN | DIASTOLIC BLOOD PRESSURE: 62 MMHG

## 2024-07-25 DIAGNOSIS — R53.1 WEAKNESS: ICD-10-CM

## 2024-07-25 DIAGNOSIS — F41.9 ANXIETY: ICD-10-CM

## 2024-07-25 DIAGNOSIS — G45.9 TIA (TRANSIENT ISCHEMIC ATTACK): ICD-10-CM

## 2024-07-25 DIAGNOSIS — I77.9 CAROTID ARTERY DISEASE (CMS-HCC): Primary | ICD-10-CM

## 2024-07-25 DIAGNOSIS — I65.23 CAROTID STENOSIS, BILATERAL: ICD-10-CM

## 2024-07-25 PROCEDURE — 1159F MED LIST DOCD IN RCRD: CPT | Performed by: SURGERY

## 2024-07-25 PROCEDURE — 3075F SYST BP GE 130 - 139MM HG: CPT | Performed by: SURGERY

## 2024-07-25 PROCEDURE — 3078F DIAST BP <80 MM HG: CPT | Performed by: SURGERY

## 2024-07-25 PROCEDURE — 99214 OFFICE O/P EST MOD 30 MIN: CPT | Performed by: SURGERY

## 2024-07-25 PROCEDURE — 3008F BODY MASS INDEX DOCD: CPT | Performed by: SURGERY

## 2024-07-25 PROCEDURE — 1160F RVW MEDS BY RX/DR IN RCRD: CPT | Performed by: SURGERY

## 2024-07-25 RX ORDER — CLONAZEPAM 0.5 MG/1
1 TABLET ORAL DAILY PRN
Qty: 15 TABLET | Refills: 2 | OUTPATIENT
Start: 2024-07-25 | End: 2025-01-21

## 2024-07-28 PROBLEM — I77.9 CAROTID ARTERY DISEASE (CMS-HCC): Status: ACTIVE | Noted: 2024-07-28

## 2024-07-28 NOTE — PROGRESS NOTES
"Ruth Ann Martinez is a 69 y.o. female     Subjective   This patient presents today for follow-up of her carotid artery disease.  She did have a CTA of her neck and head in June of this year.  A small outpouching of her proximal right ICA was identified.  She currently denies any constitutional symptoms associated with her carotid artery disease.  However, she is having bilateral hand tremors and leg weakness bilaterally.  She states that she was diagnosed with MS 25 years ago and then another neurologist said that she did not have MS.  The symptoms are new and progressing.         Objective     Vitals:    07/25/24 0945   BP: 138/62   Pulse: 60      Physical Exam  This patient is alert and oriented x 3.  She is in no acute distress.  Head is normocephalic.  Pupils are equal and reactive to light and accommodation.  Neck is soft and supple without palpable lymph nodes.  Heart is regular rate.  Lungs are clear.  Abdomen is soft with positive bowel sounds there is no pain on palpation.  She does have some tremors involving her right and left upper extremities.  She also has some weakness involving her lower extremities left greater than right.  Psychologically she seems to be acting appropriately.  She does have palpable brachial radial femoral and popliteal pulses.  Blood pressure 138/62, pulse 60, height 1.676 m (5' 6\"), weight 86.2 kg (190 lb).            Patient Active Problem List    Diagnosis Date Noted    Anemia, iron deficiency 06/22/2023    Cervical cancer (Multi) 06/22/2023    Diarrhea 06/22/2023    Flank pain 06/22/2023    Abnormal brain MRI 03/10/2023    Arthritis 03/10/2023    Carpal tunnel syndrome of right wrist 03/10/2023    Cerebral aneurysm, nonruptured ([facility]) 03/10/2023    Cervical stenosis of spine 03/10/2023    Cervical myelopathy (Multi) 03/10/2023    Chest wall pain 03/10/2023    Cough 03/10/2023    Degenerative spondylolisthesis 03/10/2023    Depression 03/10/2023    Disc degeneration, " lumbosacral 03/10/2023    Dyspnea on exertion 03/10/2023    Excessive daytime sleepiness 03/10/2023    Fecal incontinence 03/10/2023    Fullness of breast 03/10/2023    Fibromyalgia 03/10/2023    Hair loss 03/10/2023    Headache 03/10/2023    Hemiplegic migraine 03/10/2023    Herpes zoster 03/10/2023    Hordeolum externum of right lower eyelid 03/10/2023    Hypercholesterolemia 03/10/2023    Hyperglycemia 03/10/2023    Hyperhidrosis 03/10/2023    Insomnia 03/10/2023    Irritable bowel syndrome 03/10/2023    Lumbago 03/10/2023    Lumbar radiculopathy 03/10/2023    Lumbar spondylosis 03/10/2023    Memory loss 03/10/2023    Migraines 03/10/2023    Nausea and vomiting 03/10/2023    Numbness of hand 03/10/2023    Oligouria 03/10/2023    ROSAMARIA (obstructive sleep apnea) 03/10/2023    Osteoporosis 03/10/2023    Other intervertebral disc displacement, lumbar region 03/10/2023    Other intervertebral disc displacement, lumbosacral region 03/10/2023    Postlaminectomy syndrome, lumbar 03/10/2023    Restless legs syndrome 03/10/2023    Snoring 03/10/2023    Stroke (Multi) 03/10/2023    Thoracic spinal stenosis 03/10/2023    Thrombocytopenia (CMS-HCC) 03/10/2023    TIA (transient ischemic attack) 03/10/2023    Vitamin B12 deficiency 03/10/2023    Vitamin D deficiency 03/10/2023    Weight loss 03/10/2023    URI, acute 03/10/2023    Pancytopenia (Multi) 10/20/2020    Weakness 10/20/2020    Dysarthria 04/20/2017    Gait abnormality 04/20/2017    Anxiety state 11/16/2016    CHASIDY (generalized anxiety disorder) 11/16/2016    Fibromyositis 11/15/2016    Palpitations 11/15/2016    Major depressive disorder, severe (Multi) 10/21/2016    Seizure disorder (Multi) 08/24/2016    Convulsions (Multi) 08/24/2016    Optic neuritis 08/05/2016    Anemia 08/13/2014    Acute left hemiparesis (Multi) 06/27/2013    Left-sided weakness 06/27/2013    HTN (hypertension) 06/24/2010    Nonspecific abnormal results of liver function study 09/19/2008     Carcinoma in situ of cervix uteri 05/08/2008    Esophagitis 07/26/2007    Multiple sclerosis (Multi) 07/26/2007    Spasmodic torticollis 10/30/2006          Current Outpatient Medications:     baclofen (Lioresal) 10 mg tablet, Take 1 tablet (10 mg) by mouth 3 times a day., Disp: , Rfl:     clonazePAM (KlonoPIN) 0.5 mg tablet, Take 2 tablets (1 mg) by mouth once daily as needed for anxiety., Disp: 15 tablet, Rfl: 2    dicyclomine (Bentyl) 20 mg tablet, Take 1 tablet (20 mg) by mouth 4 times a day before meals., Disp: 30 tablet, Rfl: 0    fremanezumab (Ajovy) 225 mg/1.5 mL auto-injector, Inject 1 Pen (225 mg) under the skin every 30 (thirty) days., Disp: 1 each, Rfl: 11    gabapentin (Neurontin) 600 mg tablet, Take 2 tablets (1,200 mg) by mouth 2 times a day., Disp: , Rfl:     lidocaine (Lidoderm) 5 % patch, Place 1 patch over 12 hours on the skin once daily. Remove & discard patch within 12 hours or as directed by MD., Disp: 15 patch, Rfl: 0    melatonin 5 mg tablet, Take 1-2 tablets (5-10 mg) by mouth as needed at bedtime., Disp: , Rfl:     methocarbamol (Robaxin) 500 mg tablet, Take 1 tablet (500 mg) by mouth 4 times a day for 10 days., Disp: 40 tablet, Rfl: 0    ondansetron (Zofran) 4 mg tablet, Take 1 tablet (4 mg) by mouth every 12 hours if needed for nausea or vomiting., Disp: , Rfl:     oxyCODONE-acetaminophen (Percocet) 7.5-325 mg tablet, Take 1 tablet by mouth 3 times a day., Disp: , Rfl:     QUEtiapine (SEROquel) 100 mg tablet, Take 1 tablet (100 mg) by mouth 2 times a day as needed (FOR HEADACHE OR PAIN OR SLEEP. MAY INCREASE TO 2 TABLETS AT BEDTIME)., Disp: , Rfl:     sertraline (Zoloft) 100 mg tablet, Take 1 tablet (100 mg) by mouth once daily in the morning., Disp: 90 tablet, Rfl: 1    tiZANidine (Zanaflex) 4 mg tablet, Take 1 tablet (4 mg) by mouth every 6 hours if needed for muscle spasms for up to 23 days., Disp: 30 tablet, Rfl: 2    traZODone (Desyrel) 100 mg tablet, Take 1-2 tablets (100-200 mg) by  mouth once daily at bedtime., Disp: 180 tablet, Rfl: 2     Lab Results   Component Value Date    WBC 4.4 07/14/2024    HGB 14.3 07/14/2024    HCT 40.9 07/14/2024     (L) 07/14/2024    CHOL 247 (H) 01/30/2021    TRIG 210 (H) 01/30/2021    HDL 41.9 01/30/2021    ALT 15 07/14/2024    AST 20 07/14/2024     07/14/2024    K 4.1 07/14/2024     (H) 07/14/2024    CREATININE 0.98 07/14/2024    BUN 19 07/14/2024    CO2 25 07/14/2024    TSH 2.06 10/04/2019    INR 1.0 06/12/2024    HGBA1C 5.6 02/14/2023       CT abdomen pelvis w IV contrast    Result Date: 7/14/2024  Interpreted By:  Naveen Kelley, STUDY: CT ABDOMEN PELVIS W IV CONTRAST;  7/14/2024 5:52 pm   INDICATION: Signs/Symptoms:r/o stone vs diverticulitis; + bilateral LQ tenderness L > R, left flank.   COMPARISON: CT abdomen and pelvis 07/20/2023   ACCESSION NUMBER(S): [id number]   ORDERING CLINICIAN: TOBI EVANS   TECHNIQUE: Contiguous axial images of the abdomen and pelvis were obtained after the intravenous administration of 80 mL Omnipaque 350 contrast. Coronal and sagittal reformatted images were reconstructed from the axial data.   FINDINGS: LOWER CHEST: Mild dependent atelectatic changes in the bilateral lung bases.   LIVER: No significant parenchymal abnormality.   BILE DUCTS: No significant intrahepatic or extrahepatic dilatation.   GALLBLADDER: No significant abnormality.   PANCREAS: Fatty infiltration.   SPLEEN: No significant abnormality.   ADRENALS: No significant abnormality.   KIDNEYS, URETERS, BLADDER: No hydronephrosis or hydroureter. No appreciable renal or ureteral calculus. The bladder is unremarkable.   REPRODUCTIVE ORGANS: No significant abnormality.   GI: No obstruction. No bowel wall thickening or adjacent inflammatory change. Normal appendix.   VESSELS: No significant abnormality. The portal veins and IVC are patent.   PERITONEUM/RETROPERITONEUM: No ascites, free air, or fluid collection.   LYMPH NODES: No enlarged lymph  nodes.   ABDOMINAL WALL: No significant abnormality.   OSSEOUS STRUCTURES: No acute osseous abnormality. Posterior fusion of L4-S1 with intervertebral spacers at L4-L5 and L5-S1.       1. No acute abnormality within the abdomen or pelvis. 2. No renal or ureteral calculus or hydroureteronephrosis. 3. No appreciable bowel inflammation. 4. Posterior fusion of L4-S1.   MACRO: None   Signed by: Naveen Kelley 7/14/2024 6:48 PM Dictation workstation:   [id number]                Assessment/Plan   Active Problems:  There are no active Hospital Problems.      This patient presents today for follow-up of her right carotid issues.  Back in June of this year, she did have a CTA of her head and neck due to some migraine issues that she has been experiencing.  She states that 25 years ago, she was diagnosed with MS and then was told that she does not have MS.  Over the past couple of weeks, she has been experiencing upper extremity hand tremors and weakness of both lower extremities.  I did review her CTA of her head and neck.  There is a small outpouching of her proximal right internal carotid artery.  I do not believe that this is causing her current acute symptoms.  I did review her medication list.  I do feel that she should be on a statin and an aspirin.  I will touch base with her primary care physician concerning her medications.  Also, I will touch base with her neurologist Dr. Saldana to notify him of her recent symptoms.  She has not had a carotid duplex scan that I can see in her medical record.  I would like her to have a carotid duplex scan and follow-up with me after her testing is done.      Naveen Hollis, DO

## 2024-08-01 ENCOUNTER — TELEPHONE (OUTPATIENT)
Dept: NEUROLOGY | Facility: CLINIC | Age: 69
End: 2024-08-01
Payer: MEDICARE

## 2024-08-01 NOTE — TELEPHONE ENCOUNTER
Pt states that for the past two weeks she has been having some weird symptoms - complains of her legs feeling stiff but also states they are weak and will buckle under her, has also been having hand tremors (states this has slightly improved over the past week).  States no change in meds other than the Ajovy you started her on in May.  Please advise.

## 2024-08-08 ENCOUNTER — APPOINTMENT (OUTPATIENT)
Dept: NEUROLOGY | Facility: CLINIC | Age: 69
End: 2024-08-08
Payer: MEDICARE

## 2024-08-14 DIAGNOSIS — F41.9 ANXIETY: ICD-10-CM

## 2024-08-14 RX ORDER — CLONAZEPAM 0.5 MG/1
1 TABLET ORAL DAILY PRN
Qty: 15 TABLET | Refills: 1 | OUTPATIENT
Start: 2024-08-14 | End: 2025-02-10

## 2024-08-22 ENCOUNTER — APPOINTMENT (OUTPATIENT)
Dept: NEUROLOGY | Facility: CLINIC | Age: 69
End: 2024-08-22
Payer: MEDICARE

## 2024-08-22 ENCOUNTER — OFFICE VISIT (OUTPATIENT)
Dept: NEUROLOGY | Facility: CLINIC | Age: 69
End: 2024-08-22
Payer: MEDICARE

## 2024-08-22 VITALS
TEMPERATURE: 97.5 F | RESPIRATION RATE: 16 BRPM | SYSTOLIC BLOOD PRESSURE: 124 MMHG | WEIGHT: 191.4 LBS | DIASTOLIC BLOOD PRESSURE: 82 MMHG | BODY MASS INDEX: 30.76 KG/M2 | HEIGHT: 66 IN | HEART RATE: 61 BPM

## 2024-08-22 DIAGNOSIS — G95.9 CERVICAL MYELOPATHY (MULTI): ICD-10-CM

## 2024-08-22 DIAGNOSIS — F32.2 MAJOR DEPRESSIVE DISORDER, SEVERE (MULTI): ICD-10-CM

## 2024-08-22 DIAGNOSIS — M48.02 CERVICAL STENOSIS OF SPINE: ICD-10-CM

## 2024-08-22 DIAGNOSIS — R00.2 PALPITATIONS: ICD-10-CM

## 2024-08-22 DIAGNOSIS — M54.50 CHRONIC BILATERAL LOW BACK PAIN WITHOUT SCIATICA: ICD-10-CM

## 2024-08-22 DIAGNOSIS — F41.1 GAD (GENERALIZED ANXIETY DISORDER): ICD-10-CM

## 2024-08-22 DIAGNOSIS — G43.109 MIGRAINE WITH AURA AND WITHOUT STATUS MIGRAINOSUS, NOT INTRACTABLE: Primary | ICD-10-CM

## 2024-08-22 DIAGNOSIS — F41.1 ANXIETY STATE: ICD-10-CM

## 2024-08-22 DIAGNOSIS — G40.909 SEIZURE DISORDER (MULTI): ICD-10-CM

## 2024-08-22 DIAGNOSIS — G25.81 RESTLESS LEGS SYNDROME: ICD-10-CM

## 2024-08-22 DIAGNOSIS — G89.29 CHRONIC BILATERAL LOW BACK PAIN WITHOUT SCIATICA: ICD-10-CM

## 2024-08-22 DIAGNOSIS — R26.81 UNSTEADY GAIT: ICD-10-CM

## 2024-08-22 PROCEDURE — 1160F RVW MEDS BY RX/DR IN RCRD: CPT | Performed by: PSYCHIATRY & NEUROLOGY

## 2024-08-22 PROCEDURE — 3008F BODY MASS INDEX DOCD: CPT | Performed by: PSYCHIATRY & NEUROLOGY

## 2024-08-22 PROCEDURE — G2211 COMPLEX E/M VISIT ADD ON: HCPCS | Performed by: PSYCHIATRY & NEUROLOGY

## 2024-08-22 PROCEDURE — 1036F TOBACCO NON-USER: CPT | Performed by: PSYCHIATRY & NEUROLOGY

## 2024-08-22 PROCEDURE — 3079F DIAST BP 80-89 MM HG: CPT | Performed by: PSYCHIATRY & NEUROLOGY

## 2024-08-22 PROCEDURE — 3074F SYST BP LT 130 MM HG: CPT | Performed by: PSYCHIATRY & NEUROLOGY

## 2024-08-22 PROCEDURE — 99215 OFFICE O/P EST HI 40 MIN: CPT | Performed by: PSYCHIATRY & NEUROLOGY

## 2024-08-22 PROCEDURE — 1159F MED LIST DOCD IN RCRD: CPT | Performed by: PSYCHIATRY & NEUROLOGY

## 2024-08-22 RX ORDER — DIVALPROEX SODIUM 500 MG/1
500 TABLET, DELAYED RELEASE ORAL DAILY
Qty: 30 TABLET | Refills: 11 | Status: SHIPPED | OUTPATIENT
Start: 2024-08-22 | End: 2025-08-22

## 2024-08-22 ASSESSMENT — ENCOUNTER SYMPTOMS
LOSS OF SENSATION IN FEET: 0
HEADACHES: 1
OCCASIONAL FEELINGS OF UNSTEADINESS: 1
DEPRESSION: 0

## 2024-08-22 NOTE — PROGRESS NOTES
Subjective     Ruth Ann Martinez 69 y.o.  HPI  The patient states that she continues to have daily headaches that are mainly above her left eye.  She states that they are sharp and she rates them at approximately a 7 out of 10 in severity.  She states that she took Ajovy for 3 months without any improvement in her headaches.  She denies any nausea or vomiting but she does have both photo and phonophobia.  The headache can last anywhere from 30 minutes to 4 hours.  The patient states that she went to the ER on 7/14/2024 for a hemiplegic migraine and she improved in the ER.  The patient had a comprehensive metabolic panel that was essentially normal and his CBC that was normal with exception of a low platelet count at 106 on 7/14/2024.  The patient is taking Seroquel 100 mg tabs as needed for severe headaches and this was prescribed by me in 2018.    The patient also states that for the last 6 to 8 weeks she has had difficulty with ambulation and she has had about 3 falls.  She states she did not injure herself.    The patient is compliant with her trazodone, Zanaflex as needed, Zoloft, Percocet, Robaxin, lidocaine patch, Neurontin, Ajovy, Bentyl, Klonopin and baclofen.      Review of Systems   Neurological:  Positive for headaches.   All other systems reviewed and are negative.       Patient Active Problem List   Diagnosis    Abnormal brain MRI    Anxiety state    Arthritis    Carpal tunnel syndrome of right wrist    Cerebral aneurysm, nonruptured (HHS-HCC)    Cervical stenosis of spine    Cervical myelopathy (Multi)    Chest wall pain    Cough    Degenerative spondylolisthesis    Depression    Disc degeneration, lumbosacral    Dyspnea on exertion    Excessive daytime sleepiness    Fecal incontinence    Fullness of breast    Fibromyalgia    Esophagitis    Hair loss    Headache    Hemiplegic migraine    Herpes zoster    Hordeolum externum of right lower eyelid    Hypercholesterolemia    Hyperglycemia    Hyperhidrosis     Insomnia    Irritable bowel syndrome    Lumbago    Lumbar radiculopathy    Lumbar spondylosis    Memory loss    Migraines    Nausea and vomiting    Numbness of hand    Oligouria    ROSAMARIA (obstructive sleep apnea)    Osteoporosis    Other intervertebral disc displacement, lumbar region    Other intervertebral disc displacement, lumbosacral region    Postlaminectomy syndrome, lumbar    Restless legs syndrome    Snoring    Stroke (Multi)    Thoracic spinal stenosis    Thrombocytopenia (CMS-HCC)    TIA (transient ischemic attack)    Vitamin B12 deficiency    Vitamin D deficiency    Weight loss    URI, acute    Acute left hemiparesis (Multi)    Left-sided weakness    Anemia, iron deficiency    Anemia    Pancytopenia (Multi)    Carcinoma in situ of cervix uteri    Cervical cancer (Multi)    Seizure disorder (Multi)    Convulsions (Multi)    Diarrhea    Dysarthria    Fibromyositis    CHASIDY (generalized anxiety disorder)    Gait abnormality    HTN (hypertension)    Major depressive disorder, severe (Multi)    Multiple sclerosis (Multi)    Nonspecific abnormal results of liver function study    Optic neuritis    Palpitations    Spasmodic torticollis    Weakness    Flank pain    Carotid artery disease (CMS-HCC)        Past Medical History:   Diagnosis Date    Abdominal distension (gaseous) 06/11/2018    Abdominal bloating    Cellulitis of face 12/29/2016    Cellulitis, face    Encounter for screening for malignant neoplasm of cervix     Pap smear for cervical cancer screening    Impacted cerumen, bilateral 07/01/2016    Bilateral impacted cerumen    Impacted cerumen, left ear 01/26/2015    Left ear impacted cerumen    Ocular pain, left eye 12/22/2016    Eye pain, left    Other intervertebral disc displacement, lumbar region     Lumbar herniated disc    Personal history of diseases of the blood and blood-forming organs and certain disorders involving the immune mechanism 08/30/2019    History of thrombocytopenia    Personal  history of malignant neoplasm of cervix uteri     History of malignant neoplasm of cervix uteri    Personal history of other diseases of the digestive system     History of hemorrhoids    Personal history of other diseases of the respiratory system 03/22/2016    History of bronchitis    Personal history of other diseases of the respiratory system 07/01/2016    History of sore throat    Personal history of other diseases of urinary system 06/15/2015    History of hematuria    Personal history of other drug therapy 10/03/2016    History of influenza vaccination    Personal history of other medical treatment     H/O mammogram    Personal history of other medical treatment     Transfusion history    Personal history of other specified conditions 02/23/2018    History of abdominal pain    Personal history of other specified conditions 08/04/2015    History of dysuria    Personal history of other specified conditions 07/14/2014    History of fever    Personal history of other specified conditions 05/04/2018    History of confusion    Spinal stenosis, lumbar region without neurogenic claudication     Lumbar stenosis        Past Surgical History:   Procedure Laterality Date    COLONOSCOPY  08/26/2017    Complete Colonoscopy    CT ANGIO CORONARY ART WITH HEARTFLOW IF SCORE >30%  2/1/2021    CT HEART CORONARY ANGIOGRAM 2/1/2021 [facility]    KNEE ARTHROSCOPY W/ DEBRIDEMENT  09/09/2013    Arthroscopy Knee    LUMBAR FUSION  03/05/2014    Lumbar Vertebral Fusion    LUMBAR LAMINECTOMY  09/09/2013    Laminectomy Lumbar    MR HEAD ANGIO WO IV CONTRAST  9/10/2022    MR HEAD ANGIO WO IV CONTRAST 9/10/2022 [facility]    MR NECK ANGIO WO IV CONTRAST  9/10/2022    MR NECK ANGIO WO IV CONTRAST 9/10/2022 [facility]    OTHER SURGICAL HISTORY  09/09/2013    Nerve Block Transforaminal Epidural Lumbar    OTHER SURGICAL HISTORY  01/26/2015    Cervical Conization By Cold Knife    OTHER SURGICAL HISTORY  06/15/2015     Ovarian Cystectomy    ROTATOR CUFF REPAIR  2013    Rotator Cuff Repair    TOTAL KNEE ARTHROPLASTY  2013    Knee Replacement    TUBAL LIGATION  06/15/2015    Tubal Ligation        Social History     Socioeconomic History    Marital status:      Spouse name: Not on file    Number of children: Not on file    Years of education: Not on file    Highest education level: Not on file   Occupational History    Not on file   Tobacco Use    Smoking status: Former     Types: Cigarettes     Passive exposure: Past    Smokeless tobacco: Never   Vaping Use    Vaping status: Never Used   Substance and Sexual Activity    Alcohol use: Not Currently    Drug use: Never    Sexual activity: Not on file   Other Topics Concern    Not on file   Social History Narrative    Not on file     Social Determinants of Health     Financial Resource Strain: Not on File (10/15/2020)    Received from [facility]    Financial Resource Strain     Financial Resource Strain: 0   Food Insecurity: Not on File (10/15/2020)    Received from [facility]    Food Insecurity     Food: 0   Transportation Needs: Not on File (10/15/2020)    Received from [facility]    Transportation Needs     Transportation: 0   Physical Activity: Not on File (10/15/2020)    Received from [facility]    Physical Activity     Physical Activity: 0   Stress: Not on File (10/15/2020)    Received from [facility]    Stress     Stress: 0   Social Connections: Not on File (10/15/2020)    Received from [facility]    Social Connections     Social Connections and Isolation: 0   Intimate Partner Violence: Not on file   Housing Stability: Not on File (10/15/2020)    Received from [facility]    Housing Stability     Housin        Family History   Problem Relation Name Age of Onset    Pernicious anemia Father      Pancreatic cancer Father          Current Outpatient Medications   Medication Instructions    baclofen (LIORESAL) 10 mg, oral, 3 times daily     "clonazePAM (KLONOPIN) 1 mg, oral, Daily PRN    dicyclomine (BENTYL) 20 mg, oral, 4 times daily before meals and nightly    fremanezumab (AJOVY) 225 mg, subcutaneous, Every 30 days    gabapentin (NEURONTIN) 1,200 mg, oral, 2 times daily    lidocaine (Lidoderm) 5 % patch 1 patch, transdermal, Daily, Remove & discard patch within 12 hours or as directed by MD.    melatonin 5-10 mg, oral, Nightly PRN    methocarbamol (ROBAXIN) 500 mg, oral, 4 times daily    ondansetron (ZOFRAN) 4 mg, oral, Every 12 hours PRN    oxyCODONE-acetaminophen (Percocet) 7.5-325 mg tablet 1 tablet, oral, 3 times daily    QUEtiapine (SEROQUEL) 100 mg, oral, 2 times daily PRN    sertraline (ZOLOFT) 100 mg, oral, Every morning    tiZANidine (ZANAFLEX) 4 mg, oral, Every 6 hours PRN    traZODone (DESYREL) 100-200 mg, oral, Nightly        Allergies   Allergen Reactions    Buspirone Unknown    Ciprofloxacin Hallucinations    Copaxone [Glatiramer] Other     Injection site reaction     Erythromycin Base Diarrhea and Hallucinations    Hydroxyzine Unknown    Iron Sucrose Unknown    Metoclopramide Hcl Unknown    Pregabalin Swelling    Sulfamethoxazole-Trimethoprim Hallucinations    Qkvdetbl-5-Ex2 Antimigraine Agents Palpitations and Other     bradycardia        Objective  /82 (BP Location: Right arm)   Pulse 61   Temp 36.4 °C (97.5 °F)   Resp 16   Ht 1.676 m (5' 6\")   Wt 86.8 kg (191 lb 6.4 oz)   BMI 30.89 kg/m²    GENERAL APPEARANCE:  No distress, alert and cooperative.     MENTAL STATE:  Orientation was normal to time, place and person. Recent and remote memory was intact.  Attention span and concentration were normal. Language testing was normal for comprehension, repetition, expression, and naming. Calculation was intact. The patient could correctly interpret a picture, and copy a diagram. General fund of knowledge was intact. Mini-mental status examination was performed with no errors.     CRANIAL NERVES:  Cranial nerves were normal.      " CN 2- Visual Acuity  OD: 20/20 (corrected)   OS: 20/20 (corrected); visual fields full to confrontation.      CN 3, 4, 6-  Pupils round, 4 mm in diameter, equally reactive to light. No ptosis. EOMs normal alignment, full range of movement, no nystagmus     CN 5- Facial sensation intact bilaterally. Normal corneal reflexes.      CN 7- Normal and symmetric facial strength. Nasolabial folds symmetric.     CN 8- Hearing intact to finger rub, whisper.      CN 9- Palate elevates symmetrically. Normal gag reflex.      CN 11- Normal strength of shoulder shrug and neck turning      CN 12- Tongue midline, with normal bulk and strength; no fasciculations.     MOTOR: The patient's motor testing shows normal tone bulk and power in the upper and lower extremities bilaterally with exception that the patient has some giveaway weakness of the left upper and left lower extremities.    GAIT: The patient's gait appears to be mildly unsteady and may also have astasia-abasia.    Assessment/Plan   The patient has continued to have headaches and also has had issues with her walking.  The patient needs MRIs of the brain and cervical spine without contrast.  I will start her on Depakote 500 mg tabs daily.  I did warn her of the possibility of tremor, sedation, hair loss, GI side effects and weight gain with this medicine.  I will discontinue the Ajovy.  The patient can continue symptomatic pain medicines for severe headaches.  The patient should continue all of her medicines and stroke risk factor modification.  The patient should follow-up with pain management as scheduled.  The patient needs to lose weight to her ideal body weight, avoid supine position, improve sleep hygiene and get at least 8 hours of sleep at night.  I discussed all these issues in detail with the patient and answered all of her questions.  I will follow-up with the patient in 6 months.

## 2024-08-27 ENCOUNTER — OFFICE VISIT (OUTPATIENT)
Dept: PRIMARY CARE | Facility: CLINIC | Age: 69
End: 2024-08-27
Payer: MEDICARE

## 2024-08-27 VITALS
HEART RATE: 63 BPM | DIASTOLIC BLOOD PRESSURE: 76 MMHG | SYSTOLIC BLOOD PRESSURE: 118 MMHG | BODY MASS INDEX: 30.67 KG/M2 | WEIGHT: 190 LBS | OXYGEN SATURATION: 99 %

## 2024-08-27 DIAGNOSIS — D64.9 ANEMIA, UNSPECIFIED TYPE: ICD-10-CM

## 2024-08-27 DIAGNOSIS — F41.9 ANXIETY: ICD-10-CM

## 2024-08-27 LAB
ALBUMIN SERPL BCP-MCNC: 4.6 G/DL (ref 3.4–5)
ALP SERPL-CCNC: 91 U/L (ref 33–136)
ALT SERPL W P-5'-P-CCNC: 12 U/L (ref 7–45)
ANION GAP SERPL CALC-SCNC: 17 MMOL/L (ref 10–20)
AST SERPL W P-5'-P-CCNC: 15 U/L (ref 9–39)
BASOPHILS # BLD AUTO: 0.01 X10*3/UL (ref 0–0.1)
BASOPHILS NFR BLD AUTO: 0.3 %
BILIRUB SERPL-MCNC: 0.5 MG/DL (ref 0–1.2)
BUN SERPL-MCNC: 14 MG/DL (ref 6–23)
CALCIUM SERPL-MCNC: 9.8 MG/DL (ref 8.6–10.6)
CHLORIDE SERPL-SCNC: 107 MMOL/L (ref 98–107)
CO2 SERPL-SCNC: 22 MMOL/L (ref 21–32)
CREAT SERPL-MCNC: 0.92 MG/DL (ref 0.5–1.05)
EGFRCR SERPLBLD CKD-EPI 2021: 68 ML/MIN/1.73M*2
EOSINOPHIL # BLD AUTO: 0.06 X10*3/UL (ref 0–0.7)
EOSINOPHIL NFR BLD AUTO: 1.5 %
ERYTHROCYTE [DISTWIDTH] IN BLOOD BY AUTOMATED COUNT: 12.7 % (ref 11.5–14.5)
GLUCOSE SERPL-MCNC: 99 MG/DL (ref 74–99)
HCT VFR BLD AUTO: 43.4 % (ref 36–46)
HGB BLD-MCNC: 14.2 G/DL (ref 12–16)
IMM GRANULOCYTES # BLD AUTO: 0.01 X10*3/UL (ref 0–0.7)
IMM GRANULOCYTES NFR BLD AUTO: 0.3 % (ref 0–0.9)
LYMPHOCYTES # BLD AUTO: 1.29 X10*3/UL (ref 1.2–4.8)
LYMPHOCYTES NFR BLD AUTO: 32.3 %
MCH RBC QN AUTO: 30.4 PG (ref 26–34)
MCHC RBC AUTO-ENTMCNC: 32.7 G/DL (ref 32–36)
MCV RBC AUTO: 93 FL (ref 80–100)
MONOCYTES # BLD AUTO: 0.27 X10*3/UL (ref 0.1–1)
MONOCYTES NFR BLD AUTO: 6.8 %
NEUTROPHILS # BLD AUTO: 2.36 X10*3/UL (ref 1.2–7.7)
NEUTROPHILS NFR BLD AUTO: 58.8 %
NRBC BLD-RTO: 0 /100 WBCS (ref 0–0)
PLATELET # BLD AUTO: 115 X10*3/UL (ref 150–450)
POTASSIUM SERPL-SCNC: 4.2 MMOL/L (ref 3.5–5.3)
PROT SERPL-MCNC: 7.4 G/DL (ref 6.4–8.2)
RBC # BLD AUTO: 4.67 X10*6/UL (ref 4–5.2)
SODIUM SERPL-SCNC: 142 MMOL/L (ref 136–145)
TSH SERPL-ACNC: 3.34 MIU/L (ref 0.44–3.98)
WBC # BLD AUTO: 4 X10*3/UL (ref 4.4–11.3)

## 2024-08-27 PROCEDURE — 1036F TOBACCO NON-USER: CPT | Performed by: STUDENT IN AN ORGANIZED HEALTH CARE EDUCATION/TRAINING PROGRAM

## 2024-08-27 PROCEDURE — 85025 COMPLETE CBC W/AUTO DIFF WBC: CPT

## 2024-08-27 PROCEDURE — 99214 OFFICE O/P EST MOD 30 MIN: CPT | Performed by: STUDENT IN AN ORGANIZED HEALTH CARE EDUCATION/TRAINING PROGRAM

## 2024-08-27 PROCEDURE — 3078F DIAST BP <80 MM HG: CPT | Performed by: STUDENT IN AN ORGANIZED HEALTH CARE EDUCATION/TRAINING PROGRAM

## 2024-08-27 PROCEDURE — 84443 ASSAY THYROID STIM HORMONE: CPT

## 2024-08-27 PROCEDURE — 1159F MED LIST DOCD IN RCRD: CPT | Performed by: STUDENT IN AN ORGANIZED HEALTH CARE EDUCATION/TRAINING PROGRAM

## 2024-08-27 PROCEDURE — 80053 COMPREHEN METABOLIC PANEL: CPT

## 2024-08-27 PROCEDURE — 3074F SYST BP LT 130 MM HG: CPT | Performed by: STUDENT IN AN ORGANIZED HEALTH CARE EDUCATION/TRAINING PROGRAM

## 2024-08-27 RX ORDER — SERTRALINE HYDROCHLORIDE 100 MG/1
100 TABLET, FILM COATED ORAL EVERY MORNING
Qty: 30 TABLET | Refills: 0 | Status: SHIPPED | OUTPATIENT
Start: 2024-08-27

## 2024-08-27 ASSESSMENT — PATIENT HEALTH QUESTIONNAIRE - PHQ9
2. FEELING DOWN, DEPRESSED OR HOPELESS: NOT AT ALL
SUM OF ALL RESPONSES TO PHQ9 QUESTIONS 1 AND 2: 0
1. LITTLE INTEREST OR PLEASURE IN DOING THINGS: NOT AT ALL

## 2024-08-27 ASSESSMENT — ENCOUNTER SYMPTOMS: DEPRESSION: 0

## 2024-08-27 NOTE — PROGRESS NOTES
Subjective   Patient ID: Ruth Ann Martinez is a 69 y.o. female who presents for Shortness of Breath, Tremors (In hands), and Fatigue.    HPI     Presents today for odd arrange of symptoms, states she has sob, tremors in hands, very fidgeting, very fatigue and tired, but no chest pain no heaache,s fever or chills, no belly pain, or weight changes, states she get cold and warm spells as well. Has been going on for a few weeks. Has seen neruology and is beeing worked up for seizures    Review of Systems   All other systems reviewed and are negative.      Objective   /76 (BP Location: Left arm, Patient Position: Sitting, BP Cuff Size: Large adult)   Pulse 63   Wt 86.2 kg (190 lb)   SpO2 99%   BMI 30.67 kg/m²     Physical Exam  Constitutional:       Appearance: Normal appearance.   HENT:      Head: Normocephalic and atraumatic.      Right Ear: Tympanic membrane and ear canal normal.      Left Ear: Tympanic membrane and ear canal normal.      Mouth/Throat:      Mouth: Mucous membranes are moist.      Pharynx: Oropharynx is clear.   Eyes:      Extraocular Movements: Extraocular movements intact.      Conjunctiva/sclera: Conjunctivae normal.      Pupils: Pupils are equal, round, and reactive to light.   Cardiovascular:      Rate and Rhythm: Normal rate and regular rhythm.      Pulses: Normal pulses.      Heart sounds: Normal heart sounds.   Pulmonary:      Effort: Pulmonary effort is normal.      Breath sounds: Normal breath sounds.   Abdominal:      General: Abdomen is flat. Bowel sounds are normal.      Palpations: Abdomen is soft.   Musculoskeletal:         General: Normal range of motion.      Cervical back: Normal range of motion and neck supple.   Skin:     General: Skin is warm and dry.      Capillary Refill: Capillary refill takes 2 to 3 seconds.   Neurological:      General: No focal deficit present.      Mental Status: She is alert and oriented to person, place, and time. Mental status is at baseline.    Psychiatric:         Mood and Affect: Mood normal.         Behavior: Behavior normal.         Thought Content: Thought content normal.         Judgment: Judgment normal.         Assessment/Plan   1. Anxiety  - very fidgeting and anxious, suspect potnetial hyperthryoidism given cold intolerance, fatigue, and tremors will order labs for her  - sertraline (Zoloft) 100 mg tablet; Take 1 tablet (100 mg) by mouth once daily in the morning.  Dispense: 30 tablet; Refill: 0  - TSH with reflex to Free T4 if abnormal  - CBC and Auto Differential  - Comprehensive Metabolic Panel    2. Anemia, unspecified type    - CBC and Auto Differential

## 2024-08-28 DIAGNOSIS — F51.01 PRIMARY INSOMNIA: ICD-10-CM

## 2024-08-28 RX ORDER — TRAZODONE HYDROCHLORIDE 100 MG/1
TABLET ORAL
Qty: 180 TABLET | Refills: 1 | Status: SHIPPED | OUTPATIENT
Start: 2024-08-28

## 2024-09-03 ENCOUNTER — APPOINTMENT (OUTPATIENT)
Dept: PRIMARY CARE | Facility: CLINIC | Age: 69
End: 2024-09-03
Payer: MEDICARE

## 2024-09-03 VITALS
DIASTOLIC BLOOD PRESSURE: 82 MMHG | HEART RATE: 90 BPM | BODY MASS INDEX: 30.53 KG/M2 | WEIGHT: 190 LBS | SYSTOLIC BLOOD PRESSURE: 122 MMHG | HEIGHT: 66 IN | OXYGEN SATURATION: 96 %

## 2024-09-03 DIAGNOSIS — R73.9 HYPERGLYCEMIA: ICD-10-CM

## 2024-09-03 DIAGNOSIS — F41.9 ANXIETY: Primary | Chronic | ICD-10-CM

## 2024-09-03 DIAGNOSIS — R25.1 TREMOR: Chronic | ICD-10-CM

## 2024-09-03 DIAGNOSIS — R11.2 NAUSEA AND VOMITING, UNSPECIFIED VOMITING TYPE: ICD-10-CM

## 2024-09-03 DIAGNOSIS — F51.01 PRIMARY INSOMNIA: ICD-10-CM

## 2024-09-03 DIAGNOSIS — R19.7 DIARRHEA, UNSPECIFIED TYPE: Chronic | ICD-10-CM

## 2024-09-03 DIAGNOSIS — K52.9 CHRONIC DIARRHEA: ICD-10-CM

## 2024-09-03 DIAGNOSIS — Z12.31 BREAST CANCER SCREENING BY MAMMOGRAM: ICD-10-CM

## 2024-09-03 DIAGNOSIS — Z51.81 MEDICATION MONITORING ENCOUNTER: ICD-10-CM

## 2024-09-03 PROCEDURE — 99214 OFFICE O/P EST MOD 30 MIN: CPT | Performed by: STUDENT IN AN ORGANIZED HEALTH CARE EDUCATION/TRAINING PROGRAM

## 2024-09-03 PROCEDURE — 1159F MED LIST DOCD IN RCRD: CPT | Performed by: STUDENT IN AN ORGANIZED HEALTH CARE EDUCATION/TRAINING PROGRAM

## 2024-09-03 PROCEDURE — 3008F BODY MASS INDEX DOCD: CPT | Performed by: STUDENT IN AN ORGANIZED HEALTH CARE EDUCATION/TRAINING PROGRAM

## 2024-09-03 PROCEDURE — 3074F SYST BP LT 130 MM HG: CPT | Performed by: STUDENT IN AN ORGANIZED HEALTH CARE EDUCATION/TRAINING PROGRAM

## 2024-09-03 PROCEDURE — 1160F RVW MEDS BY RX/DR IN RCRD: CPT | Performed by: STUDENT IN AN ORGANIZED HEALTH CARE EDUCATION/TRAINING PROGRAM

## 2024-09-03 PROCEDURE — 1036F TOBACCO NON-USER: CPT | Performed by: STUDENT IN AN ORGANIZED HEALTH CARE EDUCATION/TRAINING PROGRAM

## 2024-09-03 PROCEDURE — 3079F DIAST BP 80-89 MM HG: CPT | Performed by: STUDENT IN AN ORGANIZED HEALTH CARE EDUCATION/TRAINING PROGRAM

## 2024-09-03 RX ORDER — SERTRALINE HYDROCHLORIDE 100 MG/1
100 TABLET, FILM COATED ORAL EVERY MORNING
Qty: 90 TABLET | Refills: 3 | Status: SHIPPED | OUTPATIENT
Start: 2024-09-03

## 2024-09-03 RX ORDER — CLONAZEPAM 0.5 MG/1
1 TABLET ORAL DAILY PRN
Qty: 30 TABLET | Refills: 2 | Status: SHIPPED | OUTPATIENT
Start: 2024-09-03 | End: 2025-03-02

## 2024-09-03 RX ORDER — ONDANSETRON 4 MG/1
4 TABLET, FILM COATED ORAL EVERY 12 HOURS PRN
Qty: 20 TABLET | Refills: 0 | Status: SHIPPED | OUTPATIENT
Start: 2024-09-03

## 2024-09-03 NOTE — PROGRESS NOTES
Subjective   Patient ID: Ruth Ann Martinez is a 69 y.o. female who presents for Follow-up (Follow up and drug screening for refill on clonazepam ).  Having shakiness, diarrhea, chest tightness, weakness. Started a few weeks ago. Saw Dr Rendon and had labs checked which were unremarkable.     When she saw her neurologist 8/22 they changed her ajovy to depakote due to worsening headaches. Has MRI and carotid US scheduled.     Now noticing tremulousness in her upper extremities and head.     Increase in diarrhea since starting the depakote. States her mother has similar symptoms.     No changes to her pain medicine regimen. Sees pain management. States want to discuss possible medication de escalation. Makes her pain tolerable but states does not necessarily want to be on opiates long term.     Chronic anxiety managed with daily 100mg sertraline and as needed clonazepam. Taking 0.5mg-1mg once daily as needed. Not overusing. No side effects. Reporting more panic attacks and stressors.     Due for CSA today. Up to date on UDS. Knows not to take at same time as percocet.     No other concerns today.         ROS otherwise negative aside from what was mentioned above in HPI.      Objective   Physical Exam  Vitals reviewed.   Constitutional:       General: She is not in acute distress.     Appearance: Normal appearance.   HENT:      Head: Normocephalic.      Mouth/Throat:      Mouth: Mucous membranes are moist.   Eyes:      Pupils: Pupils are equal, round, and reactive to light.   Cardiovascular:      Rate and Rhythm: Normal rate and regular rhythm.   Pulmonary:      Effort: Pulmonary effort is normal. No respiratory distress.   Musculoskeletal:         General: Normal range of motion.   Skin:     General: Skin is warm and dry.   Neurological:      Mental Status: She is alert and oriented to person, place, and time. Mental status is at baseline.      Cranial Nerves: No cranial nerve deficit.      Comments: Rhythmic tremor of  head and shoulders   Psychiatric:         Mood and Affect: Mood normal.         Behavior: Behavior normal.         Body mass index is 30.67 kg/m².      Current Outpatient Medications:     baclofen (Lioresal) 10 mg tablet, Take 1 tablet (10 mg) by mouth 3 times a day., Disp: , Rfl:     dicyclomine (Bentyl) 20 mg tablet, Take 1 tablet (20 mg) by mouth 4 times a day before meals., Disp: 30 tablet, Rfl: 0    divalproex (Depakote) 500 mg EC tablet, Take 1 tablet (500 mg) by mouth early in the morning.. Do not crush, chew, or split., Disp: 30 tablet, Rfl: 11    gabapentin (Neurontin) 600 mg tablet, Take 2 tablets (1,200 mg) by mouth 2 times a day., Disp: , Rfl:     lidocaine (Lidoderm) 5 % patch, Place 1 patch over 12 hours on the skin once daily. Remove & discard patch within 12 hours or as directed by MD., Disp: 15 patch, Rfl: 0    melatonin 5 mg tablet, Take 1-2 tablets (5-10 mg) by mouth as needed at bedtime., Disp: , Rfl:     methocarbamol (Robaxin) 500 mg tablet, Take 1 tablet (500 mg) by mouth 4 times a day for 10 days., Disp: 40 tablet, Rfl: 0    oxyCODONE-acetaminophen (Percocet) 7.5-325 mg tablet, Take 1 tablet by mouth 3 times a day., Disp: , Rfl:     traZODone (Desyrel) 100 mg tablet, TAKE 1 TO 2 TABLETS ONCE   DAILY AT BEDTIME, Disp: 180 tablet, Rfl: 1    clonazePAM (KlonoPIN) 0.5 mg tablet, Take 2 tablets (1 mg) by mouth once daily as needed for anxiety., Disp: 30 tablet, Rfl: 2    ondansetron (Zofran) 4 mg tablet, Take 1 tablet (4 mg) by mouth every 12 hours if needed for nausea or vomiting., Disp: 20 tablet, Rfl: 0    sertraline (Zoloft) 100 mg tablet, Take 1 tablet (100 mg) by mouth once daily in the morning., Disp: 90 tablet, Rfl: 3    tiZANidine (Zanaflex) 4 mg tablet, Take 1 tablet (4 mg) by mouth every 6 hours if needed for muscle spasms for up to 23 days., Disp: 30 tablet, Rfl: 2      Assessment/Plan   Diagnoses and all orders for this visit:  Anxiety  Comments:  refill clonazepam  continue  sertraline  consider increasing sertraline  CSA completed today  Orders:  -     sertraline (Zoloft) 100 mg tablet; Take 1 tablet (100 mg) by mouth once daily in the morning.  -     clonazePAM (KlonoPIN) 0.5 mg tablet; Take 2 tablets (1 mg) by mouth once daily as needed for anxiety.  Primary insomnia  Medication monitoring encounter  Breast cancer screening by mammogram  -     BI mammo bilateral screening tomosynthesis; Future  Chronic diarrhea  -     Stool Pathogen Panel, PCR; Future  Nausea and vomiting, unspecified vomiting type  -     ondansetron (Zofran) 4 mg tablet; Take 1 tablet (4 mg) by mouth every 12 hours if needed for nausea or vomiting.  Hyperglycemia  -     Hemoglobin A1c; Future  Diarrhea, unspecified type  Comments:  possibly medication side effect  rule out infectious  Orders:  -     Stool Pathogen Panel, PCR; Future  Tremor  Comments:  following with neurology  has upcoming brain MRI next week      Follow up in 3 months       Cookie Luciano DO 09/03/24 5:05 PM

## 2024-09-11 ENCOUNTER — HOSPITAL ENCOUNTER (OUTPATIENT)
Dept: VASCULAR MEDICINE | Facility: HOSPITAL | Age: 69
Discharge: HOME | End: 2024-09-11
Payer: MEDICARE

## 2024-09-11 DIAGNOSIS — I65.23 CAROTID STENOSIS, BILATERAL: ICD-10-CM

## 2024-09-11 DIAGNOSIS — I77.9 CAROTID ARTERY DISEASE (CMS-HCC): ICD-10-CM

## 2024-09-11 DIAGNOSIS — I72.0 ANEURYSM OF CAROTID ARTERY (CMS-HCC): ICD-10-CM

## 2024-09-11 PROCEDURE — 93880 EXTRACRANIAL BILAT STUDY: CPT | Performed by: INTERNAL MEDICINE

## 2024-09-11 PROCEDURE — 93880 EXTRACRANIAL BILAT STUDY: CPT

## 2024-09-12 ENCOUNTER — APPOINTMENT (OUTPATIENT)
Dept: VASCULAR SURGERY | Facility: CLINIC | Age: 69
End: 2024-09-12
Payer: MEDICARE

## 2024-09-12 VITALS
HEART RATE: 74 BPM | SYSTOLIC BLOOD PRESSURE: 124 MMHG | BODY MASS INDEX: 30.53 KG/M2 | DIASTOLIC BLOOD PRESSURE: 78 MMHG | OXYGEN SATURATION: 95 % | WEIGHT: 190 LBS | HEIGHT: 66 IN

## 2024-09-12 DIAGNOSIS — I77.9 RIGHT-SIDED CAROTID ARTERY DISEASE, UNSPECIFIED TYPE (CMS-HCC): Primary | ICD-10-CM

## 2024-09-12 PROCEDURE — 3074F SYST BP LT 130 MM HG: CPT | Performed by: SURGERY

## 2024-09-12 PROCEDURE — 3078F DIAST BP <80 MM HG: CPT | Performed by: SURGERY

## 2024-09-12 PROCEDURE — 1159F MED LIST DOCD IN RCRD: CPT | Performed by: SURGERY

## 2024-09-12 PROCEDURE — 3008F BODY MASS INDEX DOCD: CPT | Performed by: SURGERY

## 2024-09-12 PROCEDURE — 1160F RVW MEDS BY RX/DR IN RCRD: CPT | Performed by: SURGERY

## 2024-09-12 PROCEDURE — 99213 OFFICE O/P EST LOW 20 MIN: CPT | Performed by: SURGERY

## 2024-09-13 ENCOUNTER — HOSPITAL ENCOUNTER (OUTPATIENT)
Dept: RADIOLOGY | Facility: HOSPITAL | Age: 69
Discharge: HOME | End: 2024-09-13
Payer: MEDICARE

## 2024-09-13 DIAGNOSIS — G95.9 CERVICAL MYELOPATHY (MULTI): ICD-10-CM

## 2024-09-13 DIAGNOSIS — R26.81 UNSTEADY GAIT: ICD-10-CM

## 2024-09-13 DIAGNOSIS — M48.02 CERVICAL STENOSIS OF SPINE: ICD-10-CM

## 2024-09-13 PROCEDURE — 70551 MRI BRAIN STEM W/O DYE: CPT

## 2024-09-13 PROCEDURE — 72141 MRI NECK SPINE W/O DYE: CPT

## 2024-09-16 NOTE — PROGRESS NOTES
"Ruth Ann Martinez is a 69 y.o. female     Subjective   This patient presents today for follow-up of her carotid artery disease.  She currently denies any constitutional symptoms associated with her carotid artery disease.  She was found to have a small outpouching of her proximal right internal carotid artery on a previous CAT scan.  She has had 2 back surgeries in the past.  She quit smoking in 1996.  She is currently 69 years old.  She is currently not a diabetic.  She is 5 foot 6 and weighs 190 pounds.  She denies any fever chills nausea vomiting or headache         Objective     Vitals:    09/12/24 0700   BP: 124/78   Pulse: 74   SpO2: 95%      Physical Exam  This patient is alert and oriented x 3.  She is in no acute distress.  Head is normocephalic.  Pupils are equal and reactive to light and accommodation.  Neck is soft and supple without palpable lymph nodes.  Heart is regular rate.  Lungs are relatively clear.  Abdomen is obese with positive bowel sounds there is no pain on palpation.  Upper and lower extremities seem to have adequate range of motion with palpable brachial radial femoral and popliteal pulses.  Skin turgor is adequate.  Psychologically seems to be acting appropriately.  Blood pressure 124/78, pulse 74, height 1.676 m (5' 6\"), weight 86.2 kg (190 lb), SpO2 95%.            Patient Active Problem List    Diagnosis Date Noted    Carotid artery disease (CMS-HCC) 07/28/2024    Anemia, iron deficiency 06/22/2023    Cervical cancer (Multi) 06/22/2023    Diarrhea 06/22/2023    Flank pain 06/22/2023    Abnormal brain MRI 03/10/2023    Arthritis 03/10/2023    Carpal tunnel syndrome of right wrist 03/10/2023    Cerebral aneurysm, nonruptured ([facility]) 03/10/2023    Cervical stenosis of spine 03/10/2023    Cervical myelopathy (Multi) 03/10/2023    Chest wall pain 03/10/2023    Cough 03/10/2023    Degenerative spondylolisthesis 03/10/2023    Depression 03/10/2023    Disc degeneration, lumbosacral " 03/10/2023    Dyspnea on exertion 03/10/2023    Excessive daytime sleepiness 03/10/2023    Fecal incontinence 03/10/2023    Fullness of breast 03/10/2023    Fibromyalgia 03/10/2023    Hair loss 03/10/2023    Headache 03/10/2023    Hemiplegic migraine 03/10/2023    Herpes zoster 03/10/2023    Hordeolum externum of right lower eyelid 03/10/2023    Hypercholesterolemia 03/10/2023    Hyperglycemia 03/10/2023    Hyperhidrosis 03/10/2023    Insomnia 03/10/2023    Irritable bowel syndrome 03/10/2023    Lumbago 03/10/2023    Lumbar radiculopathy 03/10/2023    Lumbar spondylosis 03/10/2023    Memory loss 03/10/2023    Migraines 03/10/2023    Nausea and vomiting 03/10/2023    Numbness of hand 03/10/2023    Oligouria 03/10/2023    ROSAMARIA (obstructive sleep apnea) 03/10/2023    Osteoporosis 03/10/2023    Other intervertebral disc displacement, lumbar region 03/10/2023    Other intervertebral disc displacement, lumbosacral region 03/10/2023    Postlaminectomy syndrome, lumbar 03/10/2023    Restless legs syndrome 03/10/2023    Snoring 03/10/2023    Stroke (Multi) 03/10/2023    Thoracic spinal stenosis 03/10/2023    Thrombocytopenia (CMS-HCC) 03/10/2023    TIA (transient ischemic attack) 03/10/2023    Vitamin B12 deficiency 03/10/2023    Vitamin D deficiency 03/10/2023    Weight loss 03/10/2023    URI, acute 03/10/2023    Pancytopenia (Multi) 10/20/2020    Weakness 10/20/2020    Dysarthria 04/20/2017    Gait abnormality 04/20/2017    Anxiety state 11/16/2016    CHASIDY (generalized anxiety disorder) 11/16/2016    Fibromyositis 11/15/2016    Palpitations 11/15/2016    Major depressive disorder, severe (Multi) 10/21/2016    Seizure disorder (Multi) 08/24/2016    Convulsions (Multi) 08/24/2016    Optic neuritis 08/05/2016    Anemia 08/13/2014    Acute left hemiparesis (Multi) 06/27/2013    Left-sided weakness 06/27/2013    HTN (hypertension) 06/24/2010    Nonspecific abnormal results of liver function study 09/19/2008    Carcinoma in situ  of cervix uteri 05/08/2008    Esophagitis 07/26/2007    Multiple sclerosis (Multi) 07/26/2007    Spasmodic torticollis 10/30/2006          Current Outpatient Medications:     baclofen (Lioresal) 10 mg tablet, Take 1 tablet (10 mg) by mouth 3 times a day., Disp: , Rfl:     clonazePAM (KlonoPIN) 0.5 mg tablet, Take 2 tablets (1 mg) by mouth once daily as needed for anxiety., Disp: 30 tablet, Rfl: 2    dicyclomine (Bentyl) 20 mg tablet, Take 1 tablet (20 mg) by mouth 4 times a day before meals., Disp: 30 tablet, Rfl: 0    divalproex (Depakote) 500 mg EC tablet, Take 1 tablet (500 mg) by mouth early in the morning.. Do not crush, chew, or split., Disp: 30 tablet, Rfl: 11    gabapentin (Neurontin) 600 mg tablet, Take 2 tablets (1,200 mg) by mouth 2 times a day., Disp: , Rfl:     lidocaine (Lidoderm) 5 % patch, Place 1 patch over 12 hours on the skin once daily. Remove & discard patch within 12 hours or as directed by MD., Disp: 15 patch, Rfl: 0    melatonin 5 mg tablet, Take 1-2 tablets (5-10 mg) by mouth as needed at bedtime., Disp: , Rfl:     methocarbamol (Robaxin) 500 mg tablet, Take 1 tablet (500 mg) by mouth 4 times a day for 10 days., Disp: 40 tablet, Rfl: 0    ondansetron (Zofran) 4 mg tablet, Take 1 tablet (4 mg) by mouth every 12 hours if needed for nausea or vomiting., Disp: 20 tablet, Rfl: 0    oxyCODONE-acetaminophen (Percocet) 7.5-325 mg tablet, Take 1 tablet by mouth 3 times a day., Disp: , Rfl:     sertraline (Zoloft) 100 mg tablet, Take 1 tablet (100 mg) by mouth once daily in the morning., Disp: 90 tablet, Rfl: 3    tiZANidine (Zanaflex) 4 mg tablet, Take 1 tablet (4 mg) by mouth every 6 hours if needed for muscle spasms for up to 23 days., Disp: 30 tablet, Rfl: 2    traZODone (Desyrel) 100 mg tablet, TAKE 1 TO 2 TABLETS ONCE   DAILY AT BEDTIME, Disp: 180 tablet, Rfl: 1     Lab Results   Component Value Date    WBC 4.0 (L) 08/27/2024    HGB 14.2 08/27/2024    HCT 43.4 08/27/2024     (L)  08/27/2024    CHOL 247 (H) 01/30/2021    TRIG 210 (H) 01/30/2021    HDL 41.9 01/30/2021    ALT 12 08/27/2024    AST 15 08/27/2024     08/27/2024    K 4.2 08/27/2024     08/27/2024    CREATININE 0.92 08/27/2024    BUN 14 08/27/2024    CO2 22 08/27/2024    TSH 3.34 08/27/2024    INR 1.0 06/12/2024    HGBA1C 5.6 02/14/2023       No results found.              Assessment/Plan   Active Problems:  There are no active Hospital Problems.      This patient presents today for follow-up of her right carotid issues.  On a previous CT scan, she was found to have a very small outpouching of the proximal right internal carotid artery.  She is unable to take a statin due to leg pain.  She has had 2 back surgeries.  She currently denies any constitutional symptoms associated with her carotid issue.  She did recently have a carotid duplex scan which showed completely normal velocities.  At this time, I would like to follow-up with her in 2 years with a repeat carotid duplex scan.  I am also encouraging her to take a baby aspirin every day.      Naveen Hollis, DO

## 2024-10-17 ENCOUNTER — APPOINTMENT (OUTPATIENT)
Dept: NEUROLOGY | Facility: CLINIC | Age: 69
End: 2024-10-17
Payer: MEDICARE

## 2024-10-24 ENCOUNTER — TELEPHONE (OUTPATIENT)
Dept: GASTROENTEROLOGY | Facility: CLINIC | Age: 69
End: 2024-10-24
Payer: MEDICARE

## 2024-10-31 ENCOUNTER — APPOINTMENT (OUTPATIENT)
Dept: GASTROENTEROLOGY | Facility: CLINIC | Age: 69
End: 2024-10-31
Payer: MEDICARE

## 2024-11-05 ENCOUNTER — APPOINTMENT (OUTPATIENT)
Dept: PRIMARY CARE | Facility: CLINIC | Age: 69
End: 2024-11-05
Payer: MEDICARE

## 2024-11-05 VITALS
OXYGEN SATURATION: 96 % | SYSTOLIC BLOOD PRESSURE: 118 MMHG | BODY MASS INDEX: 30.05 KG/M2 | HEIGHT: 66 IN | HEART RATE: 78 BPM | DIASTOLIC BLOOD PRESSURE: 80 MMHG | WEIGHT: 187 LBS

## 2024-11-05 DIAGNOSIS — R73.9 HYPERGLYCEMIA: ICD-10-CM

## 2024-11-05 DIAGNOSIS — K52.9 CHRONIC DIARRHEA: ICD-10-CM

## 2024-11-05 DIAGNOSIS — R25.1 TREMOR: ICD-10-CM

## 2024-11-05 DIAGNOSIS — D69.6 THROMBOCYTOPENIA (CMS-HCC): Chronic | ICD-10-CM

## 2024-11-05 DIAGNOSIS — R19.7 DIARRHEA, UNSPECIFIED TYPE: Primary | Chronic | ICD-10-CM

## 2024-11-05 DIAGNOSIS — Z51.81 MEDICATION MONITORING ENCOUNTER: Chronic | ICD-10-CM

## 2024-11-05 PROBLEM — D61.818 PANCYTOPENIA: Chronic | Status: RESOLVED | Noted: 2020-10-20 | Resolved: 2024-11-05

## 2024-11-05 PROBLEM — C53.9 CERVICAL CANCER: Status: RESOLVED | Noted: 2023-06-22 | Resolved: 2024-11-05

## 2024-11-05 LAB
AMPHETAMINES UR QL SCN: ABNORMAL
BARBITURATES UR QL SCN: ABNORMAL
BZE UR QL SCN: ABNORMAL
CANNABINOIDS UR QL SCN: ABNORMAL
CREAT UR-MCNC: 285.3 MG/DL (ref 20–320)
EST. AVERAGE GLUCOSE BLD GHB EST-MCNC: 105 MG/DL
HBA1C MFR BLD: 5.3 %
PCP UR QL SCN: ABNORMAL

## 2024-11-05 PROCEDURE — 80361 OPIATES 1 OR MORE: CPT

## 2024-11-05 PROCEDURE — 80373 DRUG SCREENING TRAMADOL: CPT

## 2024-11-05 PROCEDURE — 1160F RVW MEDS BY RX/DR IN RCRD: CPT | Performed by: STUDENT IN AN ORGANIZED HEALTH CARE EDUCATION/TRAINING PROGRAM

## 2024-11-05 PROCEDURE — 80354 DRUG SCREENING FENTANYL: CPT

## 2024-11-05 PROCEDURE — 80307 DRUG TEST PRSMV CHEM ANLYZR: CPT

## 2024-11-05 PROCEDURE — 1159F MED LIST DOCD IN RCRD: CPT | Performed by: STUDENT IN AN ORGANIZED HEALTH CARE EDUCATION/TRAINING PROGRAM

## 2024-11-05 PROCEDURE — 82570 ASSAY OF URINE CREATININE: CPT

## 2024-11-05 PROCEDURE — 3074F SYST BP LT 130 MM HG: CPT | Performed by: STUDENT IN AN ORGANIZED HEALTH CARE EDUCATION/TRAINING PROGRAM

## 2024-11-05 PROCEDURE — 80346 BENZODIAZEPINES1-12: CPT

## 2024-11-05 PROCEDURE — 80358 DRUG SCREENING METHADONE: CPT

## 2024-11-05 PROCEDURE — 3079F DIAST BP 80-89 MM HG: CPT | Performed by: STUDENT IN AN ORGANIZED HEALTH CARE EDUCATION/TRAINING PROGRAM

## 2024-11-05 PROCEDURE — 83036 HEMOGLOBIN GLYCOSYLATED A1C: CPT

## 2024-11-05 PROCEDURE — 80365 DRUG SCREENING OXYCODONE: CPT

## 2024-11-05 PROCEDURE — 80368 SEDATIVE HYPNOTICS: CPT

## 2024-11-05 PROCEDURE — 80349 CANNABINOIDS NATURAL: CPT

## 2024-11-05 PROCEDURE — 3008F BODY MASS INDEX DOCD: CPT | Performed by: STUDENT IN AN ORGANIZED HEALTH CARE EDUCATION/TRAINING PROGRAM

## 2024-11-05 PROCEDURE — 99214 OFFICE O/P EST MOD 30 MIN: CPT | Performed by: STUDENT IN AN ORGANIZED HEALTH CARE EDUCATION/TRAINING PROGRAM

## 2024-11-05 NOTE — PROGRESS NOTES
Subjective   Patient ID: Ruth Ann Martienz is a 69 y.o. female who presents for Follow-up (Follow up visit /Patient wanted to complete opiate urine test today ( due Dec) /Diarrhea, stomach pain mostly on the right side ( had apt with gi but her apt was cancelled due to the dr being on leave of absence) /She is having sweating, chills, feeling nauseated  . /Been using immodium and bentyl ).  Reports is wearing a diaper due to her bowels. Reports last week was having 10-12 bowel movements per day.     Now having 4-5 bowel movements per day. No blood or mucus. Not able to see GI until December. Denies fevers or sick contacts.     Reports her tremors and shaking are worse. Was started on depakote for her migraines. It is helping her migraines but has worsened her tremor. Has not followed up with her neurologist regarding this yet.     Reports only taking her klonopin 2-4x per month. Takes appropriately. No side effects.     Due for labs.                 Review of Systems   Respiratory: Negative.     Cardiovascular: Negative.    Gastrointestinal:  Positive for abdominal pain and diarrhea. Negative for blood in stool.   Neurological:  Positive for tremors and headaches.   Psychiatric/Behavioral:  The patient is nervous/anxious.    All other systems reviewed and are negative.      Objective   Physical Exam  Vitals reviewed.   Constitutional:       Appearance: Normal appearance.   Musculoskeletal:         General: Normal range of motion.   Skin:     General: Skin is warm and dry.   Neurological:      Mental Status: She is alert.      Comments: Bilateral upper extremity tremor         Body mass index is 30.18 kg/m².      Current Outpatient Medications:     baclofen (Lioresal) 10 mg tablet, Take 1 tablet (10 mg) by mouth 3 times a day., Disp: , Rfl:     clonazePAM (KlonoPIN) 0.5 mg tablet, Take 2 tablets (1 mg) by mouth once daily as needed for anxiety., Disp: 30 tablet, Rfl: 2    dicyclomine (Bentyl) 20 mg tablet, Take 1  tablet (20 mg) by mouth 4 times a day before meals., Disp: 30 tablet, Rfl: 0    divalproex (Depakote) 500 mg EC tablet, Take 1 tablet (500 mg) by mouth early in the morning.. Do not crush, chew, or split., Disp: 30 tablet, Rfl: 11    gabapentin (Neurontin) 600 mg tablet, Take 2 tablets (1,200 mg) by mouth 2 times a day., Disp: , Rfl:     lidocaine (Lidoderm) 5 % patch, Place 1 patch over 12 hours on the skin once daily. Remove & discard patch within 12 hours or as directed by MD., Disp: 15 patch, Rfl: 0    melatonin 5 mg tablet, Take 1-2 tablets (5-10 mg) by mouth as needed at bedtime., Disp: , Rfl:     ondansetron (Zofran) 4 mg tablet, Take 1 tablet (4 mg) by mouth every 12 hours if needed for nausea or vomiting., Disp: 20 tablet, Rfl: 0    oxyCODONE-acetaminophen (Percocet) 7.5-325 mg tablet, Take 1 tablet by mouth 3 times a day., Disp: , Rfl:     sertraline (Zoloft) 100 mg tablet, Take 1 tablet (100 mg) by mouth once daily in the morning., Disp: 90 tablet, Rfl: 3    tiZANidine (Zanaflex) 4 mg tablet, Take 1 tablet (4 mg) by mouth every 6 hours if needed for muscle spasms for up to 23 days., Disp: 30 tablet, Rfl: 2    traZODone (Desyrel) 100 mg tablet, TAKE 1 TO 2 TABLETS ONCE   DAILY AT BEDTIME, Disp: 180 tablet, Rfl: 1    methocarbamol (Robaxin) 500 mg tablet, Take 1 tablet (500 mg) by mouth 4 times a day for 10 days., Disp: 40 tablet, Rfl: 0      Assessment/Plan   Diagnoses and all orders for this visit:  Diarrhea, unspecified type  Comments:  acute on chronic  seeing GI in Dec  recommend seeing if sooner available  stool testing if continuing at this frequency  Orders:  -     Referral to Gastroenterology; Future  Medication monitoring encounter  Comments:  using klonopin appropriately  UDS completed today  Orders:  -     Opiate/Opioid/Benzo Prescription Compliance  -     OOB Internal Tracking  -     THC (Marijuana), Urine, Confirmation  Chronic diarrhea  -     Referral to Gastroenterology;  Future  Tremor  Comments:  follow up with neurology  Orders:  -     Referral to Neurology; Future  Hyperglycemia  -     Hemoglobin A1c  Thrombocytopenia (CMS-HCC)  Comments:  stable on recent labs from August    [facility] report reviewed for Ruth Ann Martinez today and is consistent with prescribed therapy.  We weighed the risks and benefit of this therapy and will continue with the current plan     Follow up in 3 months or sooner as needed    Cookie Luciano DO 11/08/24 12:52 AM

## 2024-11-06 ENCOUNTER — TELEPHONE (OUTPATIENT)
Dept: PRIMARY CARE | Facility: CLINIC | Age: 69
End: 2024-11-06
Payer: MEDICARE

## 2024-11-06 NOTE — TELEPHONE ENCOUNTER
Pt left message stating she got the results from her drug screen showing a positive result for thc and she is very shocked and confused as she does not use this. She is wondering if there is something she takes that would cause this test to show this result?

## 2024-11-08 ASSESSMENT — ENCOUNTER SYMPTOMS
CARDIOVASCULAR NEGATIVE: 1
TREMORS: 1
HEADACHES: 1
RESPIRATORY NEGATIVE: 1
ABDOMINAL PAIN: 1
NERVOUS/ANXIOUS: 1
BLOOD IN STOOL: 0
DIARRHEA: 1

## 2024-11-09 LAB — CARBOXYTHC UR-MCNC: 40 NG/ML

## 2024-11-15 LAB
1OH-MIDAZOLAM UR CFM-MCNC: <25 NG/ML
6MAM UR CFM-MCNC: <25 NG/ML
7AMINOCLONAZEPAM UR CFM-MCNC: 48 NG/ML
A-OH ALPRAZ UR CFM-MCNC: <25 NG/ML
ALPRAZ UR CFM-MCNC: <25 NG/ML
CHLORDIAZEP UR CFM-MCNC: <25 NG/ML
CLONAZEPAM UR CFM-MCNC: <25 NG/ML
CODEINE UR CFM-MCNC: <50 NG/ML
DIAZEPAM UR CFM-MCNC: <25 NG/ML
EDDP UR CFM-MCNC: <25 NG/ML
FENTANYL UR CFM-MCNC: <2.5 NG/ML
HYDROCODONE CTO UR CFM-MCNC: <25 NG/ML
HYDROMORPHONE UR CFM-MCNC: <25 NG/ML
LORAZEPAM UR CFM-MCNC: <25 NG/ML
METHADONE UR CFM-MCNC: <25 NG/ML
MIDAZOLAM UR CFM-MCNC: <25 NG/ML
MORPHINE UR CFM-MCNC: <50 NG/ML
NORDIAZEPAM UR CFM-MCNC: <25 NG/ML
NORFENTANYL UR CFM-MCNC: <2.5 NG/ML
NORHYDROCODONE UR CFM-MCNC: <25 NG/ML
NOROXYCODONE UR CFM-MCNC: >1000 NG/ML
NORTRAMADOL UR-MCNC: <50 NG/ML
OXAZEPAM UR CFM-MCNC: <25 NG/ML
OXYCODONE UR CFM-MCNC: 1800 NG/ML
OXYMORPHONE UR CFM-MCNC: 185 NG/ML
TEMAZEPAM UR CFM-MCNC: <25 NG/ML
TRAMADOL UR CFM-MCNC: <50 NG/ML
ZOLPIDEM UR CFM-MCNC: <25 NG/ML
ZOLPIDEM UR-MCNC: <25 NG/ML

## 2024-12-16 NOTE — PROGRESS NOTES
Subjective     History of Present Illness:   Ruth Ann Martinez is a 69 y.o. female who presents to GI clinic for follow-up.  Seen by Dr Engel in the past and then had chronic diarrhea.  EGD and colonoscopy in 2023 Negative for celiac disease H. pylori and microscopic colitis.    Has had problems for a long time but she has the abdominal pain and diarrhea on and off.  She said she goes through waves.  Sometimes she has severe pain with diarrhea 10-12 times a day but currently for example she is not having any diarrhea.  She goes to the bathroom once or twice a day.  She has excessive gas and some bloating but no diarrhea.    .        Review of Systems  Review of Systems   Constitutional: Negative.    Respiratory: Negative.     Cardiovascular: Negative.    Musculoskeletal: Negative.    Skin: Negative.        Past Medical History   has a past medical history of Abdominal distension (gaseous) (06/11/2018), Cellulitis of face (12/29/2016), Encounter for screening for malignant neoplasm of cervix, Impacted cerumen, bilateral (07/01/2016), Impacted cerumen, left ear (01/26/2015), Ocular pain, left eye (12/22/2016), Other intervertebral disc displacement, lumbar region, Personal history of diseases of the blood and blood-forming organs and certain disorders involving the immune mechanism (08/30/2019), Personal history of malignant neoplasm of cervix uteri, Personal history of other diseases of the digestive system, Personal history of other diseases of the respiratory system (03/22/2016), Personal history of other diseases of the respiratory system (07/01/2016), Personal history of other diseases of urinary system (06/15/2015), Personal history of other drug therapy (10/03/2016), Personal history of other medical treatment, Personal history of other medical treatment, Personal history of other specified conditions (02/23/2018), Personal history of other specified conditions (08/04/2015), Personal history of other  specified conditions (07/14/2014), Personal history of other specified conditions (05/04/2018), and Spinal stenosis, lumbar region without neurogenic claudication.     Social History   reports that she quit smoking about 28 years ago. Her smoking use included cigarettes. She has been exposed to tobacco smoke. She has never used smokeless tobacco. She reports that she does not currently use alcohol. She reports that she does not use drugs.     Family History  family history includes Pancreatic cancer in her father; Pernicious anemia in her father.     Allergies  Allergies   Allergen Reactions    Buspirone Unknown    Ciprofloxacin Hallucinations    Copaxone [Glatiramer] Other     Injection site reaction     Erythromycin Base Diarrhea and Hallucinations    Hydroxyzine Unknown    Iron Sucrose Unknown    Metoclopramide Hcl Unknown    Pregabalin Swelling    Sulfamethoxazole-Trimethoprim Hallucinations    [id number] Antimigraine Agents Palpitations and Other     bradycardia       Medications  Current Outpatient Medications   Medication Instructions    baclofen (LIORESAL) 10 mg, 3 times daily    clonazePAM (KLONOPIN) 1 mg, oral, Daily PRN    dicyclomine (BENTYL) 20 mg, oral, 4 times daily before meals and nightly    divalproex (DEPAKOTE) 500 mg, oral, Daily, Do not crush, chew, or split.    gabapentin (NEURONTIN) 1,200 mg, 2 times daily    lidocaine (Lidoderm) 5 % patch 1 patch, transdermal, Daily, Remove & discard patch within 12 hours or as directed by MD.    melatonin 5-10 mg, Nightly PRN    methocarbamol (ROBAXIN) 500 mg, oral, 4 times daily    ondansetron (ZOFRAN) 4 mg, oral, Every 12 hours PRN    oxyCODONE-acetaminophen (Percocet) 7.5-325 mg tablet 1 tablet, 3 times daily    sertraline (ZOLOFT) 100 mg, oral, Every morning    tiZANidine (ZANAFLEX) 4 mg, oral, Every 6 hours PRN    traZODone (Desyrel) 100 mg tablet TAKE 1 TO 2 TABLETS ONCE   DAILY AT BEDTIME       Objective   There were no vitals taken for this  visit.  Physical Exam  Vitals reviewed.   Constitutional:       Appearance: Normal appearance.   Cardiovascular:      Rate and Rhythm: Normal rate and regular rhythm.      Pulses: Normal pulses.   Pulmonary:      Effort: Pulmonary effort is normal.      Breath sounds: Normal breath sounds.   Abdominal:      General: Abdomen is flat. Bowel sounds are normal. There is no distension.      Palpations: Abdomen is soft.      Tenderness: There is no abdominal tenderness.   Musculoskeletal:         General: Normal range of motion.   Neurological:      Mental Status: She is alert.                 Assessment/Plan   Ruth Ann Martinez is a 69 y.o. female who presents to GI clinic for evaluation of abdominal bloating and diarrhea.    This could be related to IBS with diarrhea or SIBO with intermittent episodes.  Recommend she uses Xifaxan when she gets the episodes.  Will give her a course now but I told her not to start it until she has significant diarrhea.  She has had a workup in the past that has been satisfactory.  .          Alberto Corey MD

## 2024-12-17 ENCOUNTER — SPECIALTY PHARMACY (OUTPATIENT)
Dept: PHARMACY | Facility: CLINIC | Age: 69
End: 2024-12-17

## 2024-12-17 ENCOUNTER — APPOINTMENT (OUTPATIENT)
Dept: GASTROENTEROLOGY | Facility: CLINIC | Age: 69
End: 2024-12-17
Payer: MEDICARE

## 2024-12-17 VITALS
HEIGHT: 66 IN | WEIGHT: 191.2 LBS | BODY MASS INDEX: 30.73 KG/M2 | HEART RATE: 78 BPM | DIASTOLIC BLOOD PRESSURE: 84 MMHG | SYSTOLIC BLOOD PRESSURE: 127 MMHG

## 2024-12-17 DIAGNOSIS — K58.0 IRRITABLE BOWEL SYNDROME WITH DIARRHEA: Primary | ICD-10-CM

## 2024-12-17 PROCEDURE — 3008F BODY MASS INDEX DOCD: CPT | Performed by: INTERNAL MEDICINE

## 2024-12-17 PROCEDURE — 3074F SYST BP LT 130 MM HG: CPT | Performed by: INTERNAL MEDICINE

## 2024-12-17 PROCEDURE — 1160F RVW MEDS BY RX/DR IN RCRD: CPT | Performed by: INTERNAL MEDICINE

## 2024-12-17 PROCEDURE — 1036F TOBACCO NON-USER: CPT | Performed by: INTERNAL MEDICINE

## 2024-12-17 PROCEDURE — 99214 OFFICE O/P EST MOD 30 MIN: CPT | Performed by: INTERNAL MEDICINE

## 2024-12-17 PROCEDURE — 3079F DIAST BP 80-89 MM HG: CPT | Performed by: INTERNAL MEDICINE

## 2024-12-17 PROCEDURE — 1159F MED LIST DOCD IN RCRD: CPT | Performed by: INTERNAL MEDICINE

## 2024-12-17 ASSESSMENT — ENCOUNTER SYMPTOMS
CONSTITUTIONAL NEGATIVE: 1
RESPIRATORY NEGATIVE: 1
CARDIOVASCULAR NEGATIVE: 1
MUSCULOSKELETAL NEGATIVE: 1

## 2024-12-21 ENCOUNTER — SPECIALTY PHARMACY (OUTPATIENT)
Dept: PHARMACY | Facility: CLINIC | Age: 69
End: 2024-12-21

## 2025-01-04 ENCOUNTER — SPECIALTY PHARMACY (OUTPATIENT)
Dept: PHARMACY | Facility: CLINIC | Age: 70
End: 2025-01-04

## 2025-01-11 ENCOUNTER — HOSPITAL ENCOUNTER (EMERGENCY)
Facility: HOSPITAL | Age: 70
Discharge: HOME | End: 2025-01-11
Attending: EMERGENCY MEDICINE
Payer: MEDICARE

## 2025-01-11 ENCOUNTER — APPOINTMENT (OUTPATIENT)
Dept: CARDIOLOGY | Facility: HOSPITAL | Age: 70
End: 2025-01-11
Payer: MEDICARE

## 2025-01-11 ENCOUNTER — APPOINTMENT (OUTPATIENT)
Dept: RADIOLOGY | Facility: HOSPITAL | Age: 70
End: 2025-01-11
Payer: MEDICARE

## 2025-01-11 VITALS
HEART RATE: 74 BPM | DIASTOLIC BLOOD PRESSURE: 77 MMHG | SYSTOLIC BLOOD PRESSURE: 177 MMHG | WEIGHT: 190 LBS | HEIGHT: 66 IN | BODY MASS INDEX: 30.53 KG/M2 | OXYGEN SATURATION: 94 %

## 2025-01-11 DIAGNOSIS — R10.84 GENERALIZED ABDOMINAL PAIN: Primary | ICD-10-CM

## 2025-01-11 LAB
ALBUMIN SERPL BCP-MCNC: 3.9 G/DL (ref 3.4–5)
ALP SERPL-CCNC: 63 U/L (ref 33–136)
ALT SERPL W P-5'-P-CCNC: 7 U/L (ref 7–45)
ANION GAP SERPL CALC-SCNC: 14 MMOL/L (ref 10–20)
APPEARANCE UR: CLEAR
AST SERPL W P-5'-P-CCNC: 10 U/L (ref 9–39)
BASOPHILS # BLD AUTO: 0.01 X10*3/UL (ref 0–0.1)
BASOPHILS NFR BLD AUTO: 0.3 %
BILIRUB SERPL-MCNC: 0.4 MG/DL (ref 0–1.2)
BILIRUB UR STRIP.AUTO-MCNC: NEGATIVE MG/DL
BUN SERPL-MCNC: 17 MG/DL (ref 6–23)
CALCIUM SERPL-MCNC: 8.1 MG/DL (ref 8.6–10.3)
CHLORIDE SERPL-SCNC: 111 MMOL/L (ref 98–107)
CO2 SERPL-SCNC: 23 MMOL/L (ref 21–32)
COLOR UR: NORMAL
CREAT SERPL-MCNC: 0.72 MG/DL (ref 0.5–1.05)
EGFRCR SERPLBLD CKD-EPI 2021: >90 ML/MIN/1.73M*2
EOSINOPHIL # BLD AUTO: 0.07 X10*3/UL (ref 0–0.7)
EOSINOPHIL NFR BLD AUTO: 1.9 %
ERYTHROCYTE [DISTWIDTH] IN BLOOD BY AUTOMATED COUNT: 11.9 % (ref 11.5–14.5)
GLUCOSE SERPL-MCNC: 97 MG/DL (ref 74–99)
GLUCOSE UR STRIP.AUTO-MCNC: NORMAL MG/DL
HCT VFR BLD AUTO: 39.4 % (ref 36–46)
HGB BLD-MCNC: 13.8 G/DL (ref 12–16)
IMM GRANULOCYTES # BLD AUTO: 0.02 X10*3/UL (ref 0–0.7)
IMM GRANULOCYTES NFR BLD AUTO: 0.5 % (ref 0–0.9)
KETONES UR STRIP.AUTO-MCNC: NEGATIVE MG/DL
LEUKOCYTE ESTERASE UR QL STRIP.AUTO: NEGATIVE
LIPASE SERPL-CCNC: 18 U/L (ref 9–82)
LYMPHOCYTES # BLD AUTO: 1.04 X10*3/UL (ref 1.2–4.8)
LYMPHOCYTES NFR BLD AUTO: 28 %
MAGNESIUM SERPL-MCNC: 1.73 MG/DL (ref 1.6–2.4)
MCH RBC QN AUTO: 31.6 PG (ref 26–34)
MCHC RBC AUTO-ENTMCNC: 35 G/DL (ref 32–36)
MCV RBC AUTO: 90 FL (ref 80–100)
MONOCYTES # BLD AUTO: 0.26 X10*3/UL (ref 0.1–1)
MONOCYTES NFR BLD AUTO: 7 %
NEUTROPHILS # BLD AUTO: 2.31 X10*3/UL (ref 1.2–7.7)
NEUTROPHILS NFR BLD AUTO: 62.3 %
NITRITE UR QL STRIP.AUTO: NEGATIVE
NRBC BLD-RTO: 0 /100 WBCS (ref 0–0)
PH UR STRIP.AUTO: 7.5 [PH]
PLATELET # BLD AUTO: 110 X10*3/UL (ref 150–450)
POTASSIUM SERPL-SCNC: 3.7 MMOL/L (ref 3.5–5.3)
PROT SERPL-MCNC: 6.1 G/DL (ref 6.4–8.2)
PROT UR STRIP.AUTO-MCNC: NEGATIVE MG/DL
RBC # BLD AUTO: 4.37 X10*6/UL (ref 4–5.2)
RBC # UR STRIP.AUTO: NEGATIVE /UL
SODIUM SERPL-SCNC: 144 MMOL/L (ref 136–145)
SP GR UR STRIP.AUTO: 1.02
UROBILINOGEN UR STRIP.AUTO-MCNC: NORMAL MG/DL
WBC # BLD AUTO: 3.7 X10*3/UL (ref 4.4–11.3)

## 2025-01-11 PROCEDURE — 83735 ASSAY OF MAGNESIUM: CPT | Performed by: EMERGENCY MEDICINE

## 2025-01-11 PROCEDURE — 93005 ELECTROCARDIOGRAM TRACING: CPT

## 2025-01-11 PROCEDURE — 99285 EMERGENCY DEPT VISIT HI MDM: CPT | Mod: 25 | Performed by: EMERGENCY MEDICINE

## 2025-01-11 PROCEDURE — 74176 CT ABD & PELVIS W/O CONTRAST: CPT

## 2025-01-11 PROCEDURE — 96376 TX/PRO/DX INJ SAME DRUG ADON: CPT

## 2025-01-11 PROCEDURE — 85025 COMPLETE CBC W/AUTO DIFF WBC: CPT | Performed by: EMERGENCY MEDICINE

## 2025-01-11 PROCEDURE — 81003 URINALYSIS AUTO W/O SCOPE: CPT | Performed by: EMERGENCY MEDICINE

## 2025-01-11 PROCEDURE — 96372 THER/PROPH/DIAG INJ SC/IM: CPT | Performed by: EMERGENCY MEDICINE

## 2025-01-11 PROCEDURE — 2500000004 HC RX 250 GENERAL PHARMACY W/ HCPCS (ALT 636 FOR OP/ED): Performed by: EMERGENCY MEDICINE

## 2025-01-11 PROCEDURE — 36415 COLL VENOUS BLD VENIPUNCTURE: CPT | Performed by: EMERGENCY MEDICINE

## 2025-01-11 PROCEDURE — 74176 CT ABD & PELVIS W/O CONTRAST: CPT | Mod: FOREIGN READ | Performed by: RADIOLOGY

## 2025-01-11 PROCEDURE — 96374 THER/PROPH/DIAG INJ IV PUSH: CPT

## 2025-01-11 PROCEDURE — 83690 ASSAY OF LIPASE: CPT | Performed by: EMERGENCY MEDICINE

## 2025-01-11 PROCEDURE — 80053 COMPREHEN METABOLIC PANEL: CPT | Performed by: EMERGENCY MEDICINE

## 2025-01-11 PROCEDURE — 96375 TX/PRO/DX INJ NEW DRUG ADDON: CPT

## 2025-01-11 PROCEDURE — 96361 HYDRATE IV INFUSION ADD-ON: CPT

## 2025-01-11 RX ORDER — MORPHINE SULFATE 4 MG/ML
4 INJECTION, SOLUTION INTRAMUSCULAR; INTRAVENOUS ONCE
Status: COMPLETED | OUTPATIENT
Start: 2025-01-11 | End: 2025-01-11

## 2025-01-11 RX ORDER — DICYCLOMINE HYDROCHLORIDE 10 MG/ML
20 INJECTION INTRAMUSCULAR ONCE
Status: COMPLETED | OUTPATIENT
Start: 2025-01-11 | End: 2025-01-11

## 2025-01-11 RX ORDER — ONDANSETRON HYDROCHLORIDE 2 MG/ML
4 INJECTION, SOLUTION INTRAVENOUS ONCE
Status: COMPLETED | OUTPATIENT
Start: 2025-01-11 | End: 2025-01-11

## 2025-01-11 RX ORDER — ONDANSETRON 4 MG/1
4 TABLET, ORALLY DISINTEGRATING ORAL EVERY 8 HOURS PRN
Qty: 20 TABLET | Refills: 0 | Status: SHIPPED | OUTPATIENT
Start: 2025-01-11 | End: 2025-01-18

## 2025-01-11 RX ORDER — DICYCLOMINE HYDROCHLORIDE 20 MG/1
20 TABLET ORAL 2 TIMES DAILY
Qty: 20 TABLET | Refills: 0 | Status: SHIPPED | OUTPATIENT
Start: 2025-01-11 | End: 2025-01-21

## 2025-01-11 RX ADMIN — MORPHINE SULFATE 4 MG: 4 INJECTION, SOLUTION INTRAMUSCULAR; INTRAVENOUS at 13:08

## 2025-01-11 RX ADMIN — DICYCLOMINE HYDROCHLORIDE 20 MG: 10 INJECTION, SOLUTION INTRAMUSCULAR at 15:57

## 2025-01-11 RX ADMIN — SODIUM CHLORIDE, POTASSIUM CHLORIDE, SODIUM LACTATE AND CALCIUM CHLORIDE 1000 ML: 600; 310; 30; 20 INJECTION, SOLUTION INTRAVENOUS at 13:07

## 2025-01-11 RX ADMIN — MORPHINE SULFATE 4 MG: 4 INJECTION, SOLUTION INTRAMUSCULAR; INTRAVENOUS at 15:57

## 2025-01-11 RX ADMIN — ONDANSETRON 4 MG: 2 INJECTION INTRAMUSCULAR; INTRAVENOUS at 13:08

## 2025-01-11 ASSESSMENT — LIFESTYLE VARIABLES
TOTAL SCORE: 0
HAVE YOU EVER FELT YOU SHOULD CUT DOWN ON YOUR DRINKING: NO
EVER FELT BAD OR GUILTY ABOUT YOUR DRINKING: NO
EVER HAD A DRINK FIRST THING IN THE MORNING TO STEADY YOUR NERVES TO GET RID OF A HANGOVER: NO
HAVE PEOPLE ANNOYED YOU BY CRITICIZING YOUR DRINKING: NO

## 2025-01-11 ASSESSMENT — PAIN SCALES - GENERAL
PAINLEVEL_OUTOF10: 7
PAINLEVEL_OUTOF10: 2

## 2025-01-11 ASSESSMENT — PAIN - FUNCTIONAL ASSESSMENT: PAIN_FUNCTIONAL_ASSESSMENT: 0-10

## 2025-01-11 ASSESSMENT — PAIN DESCRIPTION - LOCATION: LOCATION: ABDOMEN

## 2025-01-11 NOTE — ED TRIAGE NOTES
Pt BIBA with c/o RLQ pain x few days with increasing pain. Endorses nausea but no emesis. Pt lives home alone. Alert x 4, denies CP or SOB.

## 2025-01-11 NOTE — ED PROVIDER NOTES
Emergency Department Provider Note        History of Present Illness     History provided by: Patient  Limitations to History: None  External Records Reviewed with Brief Summary: None    HPI:  Ruth Ann Martinez is a 69 y.o. female with a history of intracranial aneurysm, complex migraines with hemiaplasia, fibromyalgia, diverticulitis, chronic abdominal pain presents to the ED for 3 days of right lower abdominal pain.  Radiates from groin to flank.  Does not feel like typical abdominal pain.  Sharp in nature.  Also has some nausea.  No constipation.    Physical Exam   Triage vitals:  T    HR 74  BP (!) 189/89  RR    O2 98 %      General: Awake, alert, in no acute distress  Eyes: Gaze conjugate.  No scleral icterus or injection  HENT: Normo-cephalic, atraumatic. No stridor  CV: Regular rate, regular rhythm. Radial pulses 2+ bilaterally  Resp: Breathing non-labored, speaking in full sentences.  Clear to auscultation bilaterally  GI: Soft, diffuse tenderness  : Deferred  MSK/Extremities: No gross bony deformities. Moving all extremities  Skin: Warm. Appropriate color  Neuro: Alert. Oriented. Face symmetric. Speech is fluent.  Gross strength and sensation intact in b/l UE and LEs  Psych: Appropriate mood and affect    Medical Decision Making & ED Course   Medical Decision Makin y.o. female presents to ED with abdominal pain. History of chronic abdominal pain. Right groin with radiation. Labs WNL. CT without overt pathology. Felt improved after bentyl. Some constipation on CT. Recommend PCP follow-up.  ----      Differential diagnoses considered include but are not limited to: N/A     Social Determinants of Health which Significantly Impact Care: None identified     EKG Independent Interpretation:  Sinus rhythm rate 72.  No ST segment elevations or depressions.  No T wave abnormalities.  Normal intervals normal axis unchanged from prior EKG from 2024    Independent Result Review and Interpretation:  Relevant laboratory and radiographic results were reviewed and independently interpreted by myself.  As necessary, they are commented on in the ED Course.    Chronic conditions affecting the patient's care: As documented above in [facility]    The patient was discussed with the following consultants/services: None    Care Considerations: As documented above in [facility]    ED Course:  Diagnoses as of 01/14/25 0829   Generalized abdominal pain     Disposition   As a result of the work-up, the patient was discharged home.  she was informed of her diagnosis and instructed to come back with any concerns or worsening of condition.  she and was agreeable to the plan as discussed above.  she was given the opportunity to ask questions.  All of the patient's questions were answered.    Procedures   Procedures    Patient was seen independently    Paul Perales MD  Emergency Medicine     Paul Perales MD  01/14/25 [id number]

## 2025-01-12 LAB — HOLD SPECIMEN: NORMAL

## 2025-01-27 ENCOUNTER — APPOINTMENT (OUTPATIENT)
Dept: GASTROENTEROLOGY | Facility: CLINIC | Age: 70
End: 2025-01-27
Payer: MEDICARE

## 2025-01-27 VITALS
HEART RATE: 77 BPM | BODY MASS INDEX: 31.18 KG/M2 | SYSTOLIC BLOOD PRESSURE: 136 MMHG | DIASTOLIC BLOOD PRESSURE: 87 MMHG | WEIGHT: 194 LBS | HEIGHT: 66 IN

## 2025-01-27 DIAGNOSIS — K58.2 IRRITABLE BOWEL SYNDROME WITH BOTH CONSTIPATION AND DIARRHEA: Primary | ICD-10-CM

## 2025-01-27 DIAGNOSIS — G89.4 CHRONIC PAIN SYNDROME: ICD-10-CM

## 2025-01-27 PROCEDURE — 3075F SYST BP GE 130 - 139MM HG: CPT | Performed by: INTERNAL MEDICINE

## 2025-01-27 PROCEDURE — 1159F MED LIST DOCD IN RCRD: CPT | Performed by: INTERNAL MEDICINE

## 2025-01-27 PROCEDURE — 3008F BODY MASS INDEX DOCD: CPT | Performed by: INTERNAL MEDICINE

## 2025-01-27 PROCEDURE — 1036F TOBACCO NON-USER: CPT | Performed by: INTERNAL MEDICINE

## 2025-01-27 PROCEDURE — 99213 OFFICE O/P EST LOW 20 MIN: CPT | Performed by: INTERNAL MEDICINE

## 2025-01-27 PROCEDURE — 3079F DIAST BP 80-89 MM HG: CPT | Performed by: INTERNAL MEDICINE

## 2025-01-27 RX ORDER — DICYCLOMINE HYDROCHLORIDE 10 MG/1
10 CAPSULE ORAL 3 TIMES DAILY
Qty: 90 CAPSULE | Refills: 11 | Status: SHIPPED | OUTPATIENT
Start: 2025-01-27 | End: 2026-01-27

## 2025-01-27 NOTE — PROGRESS NOTES
Ruth Ann continues to episodes of abdominal cramps and a lot of gas.  She could not you sick 68 see fax and because of cost considerations.  She takes multiple pain medications antidepressants and gabapentin for control of chronic pain.    About a year and a half ago her  passed away and since then her symptoms have gotten worse.  She has 1 she has 1 son who is very supportive other daughter she has no contact with her.    Review of systems she is in chronic pain management anxiety and depression irritable bowel syndrome denies pulmonary symptoms or cardiac symptoms.  Intermittently gets constipated and sometimes diarrhea.  No urinary symptoms intermittently nauseated.    Physical examination vital signs reviewed alert and oriented x 3.  HEENT is unremarkable oral cavity somewhat dry carotid 2+ JVD is flat.  Heart sounds were normal chest is clear abdominal examination was completely unremarkable no distention tenderness rigidity masses or were noted good bowel sounds were heard.    Her previous workup and CT scans were reviewed.  Her CT showed a lot of stool in the right side of the colon slightly prominent gallbladder but no biliary distention clinical impression exacerbation of irritable bowel syndrome    2.  Chronic pain syndrome    Recommend dicyclomine 10 mg 3 times daily on a as needed basis take Citrucel 1 tablespoonful twice daily in 8 ounces of glasses of water.  No further workup was recommended follow-up in 3 months

## 2025-02-06 ENCOUNTER — APPOINTMENT (OUTPATIENT)
Dept: PRIMARY CARE | Facility: CLINIC | Age: 70
End: 2025-02-06
Payer: MEDICARE

## 2025-02-10 ENCOUNTER — APPOINTMENT (OUTPATIENT)
Dept: NEUROLOGY | Facility: CLINIC | Age: 70
End: 2025-02-10
Payer: MEDICARE

## 2025-02-11 ENCOUNTER — HOSPITAL ENCOUNTER (OUTPATIENT)
Dept: RADIOLOGY | Facility: HOSPITAL | Age: 70
Discharge: HOME | End: 2025-02-11
Payer: MEDICARE

## 2025-02-11 DIAGNOSIS — M47.14 OTHER SPONDYLOSIS WITH MYELOPATHY, THORACIC REGION: ICD-10-CM

## 2025-02-11 PROCEDURE — 72072 X-RAY EXAM THORAC SPINE 3VWS: CPT

## 2025-02-11 PROCEDURE — 72072 X-RAY EXAM THORAC SPINE 3VWS: CPT | Performed by: RADIOLOGY

## 2025-02-14 DIAGNOSIS — F51.01 PRIMARY INSOMNIA: ICD-10-CM

## 2025-02-14 RX ORDER — TRAZODONE HYDROCHLORIDE 100 MG/1
100-200 TABLET ORAL NIGHTLY
Qty: 180 TABLET | Refills: 1 | Status: SHIPPED | OUTPATIENT
Start: 2025-02-14

## 2025-02-28 ENCOUNTER — APPOINTMENT (OUTPATIENT)
Dept: PRIMARY CARE | Facility: CLINIC | Age: 70
End: 2025-02-28
Payer: MEDICARE

## 2025-03-04 ENCOUNTER — APPOINTMENT (OUTPATIENT)
Dept: PRIMARY CARE | Facility: CLINIC | Age: 70
End: 2025-03-04
Payer: MEDICARE

## 2025-03-04 VITALS
HEART RATE: 68 BPM | SYSTOLIC BLOOD PRESSURE: 110 MMHG | BODY MASS INDEX: 31.82 KG/M2 | DIASTOLIC BLOOD PRESSURE: 80 MMHG | HEIGHT: 66 IN | OXYGEN SATURATION: 96 % | WEIGHT: 198 LBS

## 2025-03-04 DIAGNOSIS — F51.01 PRIMARY INSOMNIA: Chronic | ICD-10-CM

## 2025-03-04 DIAGNOSIS — Z51.81 MEDICATION MONITORING ENCOUNTER: Chronic | ICD-10-CM

## 2025-03-04 DIAGNOSIS — R73.9 HYPERGLYCEMIA: Primary | ICD-10-CM

## 2025-03-04 DIAGNOSIS — F41.9 ANXIETY: Chronic | ICD-10-CM

## 2025-03-04 DIAGNOSIS — G95.9 CERVICAL MYELOPATHY: ICD-10-CM

## 2025-03-04 DIAGNOSIS — L29.9 EAR ITCHING: Chronic | ICD-10-CM

## 2025-03-04 LAB — HBA1C MFR BLD: 5.7 % (ref 4.2–6.5)

## 2025-03-04 PROCEDURE — 3079F DIAST BP 80-89 MM HG: CPT | Performed by: STUDENT IN AN ORGANIZED HEALTH CARE EDUCATION/TRAINING PROGRAM

## 2025-03-04 PROCEDURE — 99213 OFFICE O/P EST LOW 20 MIN: CPT | Performed by: STUDENT IN AN ORGANIZED HEALTH CARE EDUCATION/TRAINING PROGRAM

## 2025-03-04 PROCEDURE — 1159F MED LIST DOCD IN RCRD: CPT | Performed by: STUDENT IN AN ORGANIZED HEALTH CARE EDUCATION/TRAINING PROGRAM

## 2025-03-04 PROCEDURE — 1160F RVW MEDS BY RX/DR IN RCRD: CPT | Performed by: STUDENT IN AN ORGANIZED HEALTH CARE EDUCATION/TRAINING PROGRAM

## 2025-03-04 PROCEDURE — 1036F TOBACCO NON-USER: CPT | Performed by: STUDENT IN AN ORGANIZED HEALTH CARE EDUCATION/TRAINING PROGRAM

## 2025-03-04 PROCEDURE — G2211 COMPLEX E/M VISIT ADD ON: HCPCS | Performed by: STUDENT IN AN ORGANIZED HEALTH CARE EDUCATION/TRAINING PROGRAM

## 2025-03-04 PROCEDURE — 3008F BODY MASS INDEX DOCD: CPT | Performed by: STUDENT IN AN ORGANIZED HEALTH CARE EDUCATION/TRAINING PROGRAM

## 2025-03-04 PROCEDURE — 3074F SYST BP LT 130 MM HG: CPT | Performed by: STUDENT IN AN ORGANIZED HEALTH CARE EDUCATION/TRAINING PROGRAM

## 2025-03-04 PROCEDURE — 83036 HEMOGLOBIN GLYCOSYLATED A1C: CPT | Mod: CLIA WAIVED TEST | Performed by: STUDENT IN AN ORGANIZED HEALTH CARE EDUCATION/TRAINING PROGRAM

## 2025-03-04 ASSESSMENT — ENCOUNTER SYMPTOMS
GASTROINTESTINAL NEGATIVE: 1
RESPIRATORY NEGATIVE: 1
NERVOUS/ANXIOUS: 1
UNEXPECTED WEIGHT CHANGE: 1
BACK PAIN: 1
CARDIOVASCULAR NEGATIVE: 1

## 2025-03-04 ASSESSMENT — PATIENT HEALTH QUESTIONNAIRE - PHQ9
10. IF YOU CHECKED OFF ANY PROBLEMS, HOW DIFFICULT HAVE THESE PROBLEMS MADE IT FOR YOU TO DO YOUR WORK, TAKE CARE OF THINGS AT HOME, OR GET ALONG WITH OTHER PEOPLE: SOMEWHAT DIFFICULT
1. LITTLE INTEREST OR PLEASURE IN DOING THINGS: SEVERAL DAYS
SUM OF ALL RESPONSES TO PHQ9 QUESTIONS 1 AND 2: 2
2. FEELING DOWN, DEPRESSED OR HOPELESS: SEVERAL DAYS

## 2025-03-04 NOTE — PROGRESS NOTES
Subjective   Patient ID: Ruth Ann Martinez is a 69 y.o. female who presents for Follow-up (Med follow up, Ears feel full and itchy /Follow up).  Klonopin follow up. Had some anxiety a few weeks ago. Was able to work through this without needing any klonopin.     Having hot flashes randomly, very sweaty at times. Did not have hot flashes with menopause. Not sure if related to sugar intake and/or weight gain. Wants to work on weight loss.     Toast and jam in morning. [place] or salad for lunch. Frozen meals for dinner.     Last A1c 5.3%.     Ears feeling full and itchy last few months off and on.     Regular bowel movements since addition of fiber.     Sleep is doing better.     No other concerns today.         Review of Systems   Constitutional:  Positive for unexpected weight change.   Respiratory: Negative.     Cardiovascular: Negative.    Gastrointestinal: Negative.    Endocrine: Positive for heat intolerance.   Musculoskeletal:  Positive for back pain.   Psychiatric/Behavioral:  The patient is nervous/anxious.    All other systems reviewed and are negative.      Objective   Physical Exam  Vitals reviewed.   Constitutional:       General: She is not in acute distress.     Appearance: Normal appearance.   HENT:      Head: Normocephalic.      Right Ear: External ear normal.      Left Ear: External ear normal.      Ears:      Comments: Right ear canal with small amount of adhered cerumen     Mouth/Throat:      Mouth: Mucous membranes are moist.   Eyes:      Pupils: Pupils are equal, round, and reactive to light.   Cardiovascular:      Rate and Rhythm: Regular rhythm.   Pulmonary:      Effort: Pulmonary effort is normal. No respiratory distress.   Musculoskeletal:         General: Normal range of motion.   Skin:     General: Skin is warm and dry.   Neurological:      Mental Status: She is alert. Mental status is at baseline.   Psychiatric:         Mood and Affect: Mood normal.         Behavior: Behavior normal.          Body mass index is 31.96 kg/m².      Current Outpatient Medications:     baclofen (Lioresal) 10 mg tablet, Take 1 tablet (10 mg) by mouth 3 times a day., Disp: , Rfl:     clonazePAM (KlonoPIN) 0.5 mg tablet, Take 2 tablets (1 mg) by mouth once daily as needed for anxiety., Disp: 30 tablet, Rfl: 2    dicyclomine (Bentyl) 10 mg capsule, Take 1 capsule (10 mg) by mouth 3 times a day., Disp: 90 capsule, Rfl: 11    divalproex (Depakote) 500 mg EC tablet, Take 1 tablet (500 mg) by mouth early in the morning.. Do not crush, chew, or split., Disp: 30 tablet, Rfl: 11    gabapentin (Neurontin) 600 mg tablet, Take 2 tablets (1,200 mg) by mouth 2 times a day., Disp: , Rfl:     lidocaine (Lidoderm) 5 % patch, Place 1 patch over 12 hours on the skin once daily. Remove & discard patch within 12 hours or as directed by MD., Disp: 15 patch, Rfl: 0    melatonin 5 mg tablet, Take 1-2 tablets (5-10 mg) by mouth as needed at bedtime., Disp: , Rfl:     ondansetron (Zofran) 4 mg tablet, Take 1 tablet (4 mg) by mouth every 12 hours if needed for nausea or vomiting., Disp: 20 tablet, Rfl: 0    oxyCODONE-acetaminophen (Percocet) 7.5-325 mg tablet, Take 1 tablet by mouth 3 times a day., Disp: , Rfl:     sertraline (Zoloft) 100 mg tablet, Take 1 tablet (100 mg) by mouth once daily in the morning., Disp: 90 tablet, Rfl: 3    tiZANidine (Zanaflex) 4 mg tablet, Take 1 tablet (4 mg) by mouth every 6 hours if needed for muscle spasms for up to 23 days., Disp: 30 tablet, Rfl: 2    traZODone (Desyrel) 100 mg tablet, TAKE 1 TO 2 TABLETS BY MOUTH ONCE DAILY AT BEDTIME, Disp: 180 tablet, Rfl: 1      Assessment/Plan   Diagnoses and all orders for this visit:  Hyperglycemia  -     POCT Glycosylated Hemoglobin (HGB A1C) docked device  -     POCT GLYCOSYLATED HEMOGLOBIN (HGB A1C)  Cervical myelopathy  Medication monitoring encounter  Comments:  using klonopin very sparingly  congratulated her progress  Ear itching  Comments:  small amount of wax in  right ear  can try debrox and gentle cotton swab  Anxiety  Comments:  doing well on sertraline and rare klonopin  using her coping skills  Primary insomnia  Comments:  sleeping a little better with trazodone plus higher dose of tizanadine    [facility] report reviewed for Ruth Ann Martinez today and is consistent with prescribed therapy.  We weighed the risks and benefit of this therapy and will continue with the current plan      Follow up in 3 months        Cookie Luciano DO 03/04/25 8:51 PM

## 2025-03-05 ENCOUNTER — PATIENT MESSAGE (OUTPATIENT)
Dept: PRIMARY CARE | Facility: CLINIC | Age: 70
End: 2025-03-05
Payer: MEDICARE

## 2025-03-05 DIAGNOSIS — G47.33 MODERATE OBSTRUCTIVE SLEEP APNEA: ICD-10-CM

## 2025-04-23 DIAGNOSIS — F41.9 ANXIETY: Chronic | ICD-10-CM

## 2025-04-23 RX ORDER — CLONAZEPAM 0.5 MG/1
1 TABLET ORAL DAILY PRN
Qty: 30 TABLET | Refills: 2 | Status: SHIPPED | OUTPATIENT
Start: 2025-04-23 | End: 2025-10-20

## 2025-05-01 ENCOUNTER — HOSPITAL ENCOUNTER (EMERGENCY)
Facility: HOSPITAL | Age: 70
Discharge: HOME | End: 2025-05-01
Attending: EMERGENCY MEDICINE
Payer: MEDICARE

## 2025-05-01 VITALS
RESPIRATION RATE: 18 BRPM | SYSTOLIC BLOOD PRESSURE: 171 MMHG | TEMPERATURE: 97.5 F | HEART RATE: 67 BPM | OXYGEN SATURATION: 98 % | DIASTOLIC BLOOD PRESSURE: 79 MMHG

## 2025-05-01 DIAGNOSIS — S39.012A LUMBAR STRAIN, INITIAL ENCOUNTER: Primary | ICD-10-CM

## 2025-05-01 DIAGNOSIS — M54.41 ACUTE RIGHT-SIDED LOW BACK PAIN WITH BILATERAL SCIATICA: ICD-10-CM

## 2025-05-01 DIAGNOSIS — M54.42 ACUTE RIGHT-SIDED LOW BACK PAIN WITH BILATERAL SCIATICA: ICD-10-CM

## 2025-05-01 PROCEDURE — 2500000004 HC RX 250 GENERAL PHARMACY W/ HCPCS (ALT 636 FOR OP/ED): Mod: JZ | Performed by: EMERGENCY MEDICINE

## 2025-05-01 PROCEDURE — 2500000005 HC RX 250 GENERAL PHARMACY W/O HCPCS: Performed by: EMERGENCY MEDICINE

## 2025-05-01 PROCEDURE — 2500000001 HC RX 250 WO HCPCS SELF ADMINISTERED DRUGS (ALT 637 FOR MEDICARE OP): Performed by: EMERGENCY MEDICINE

## 2025-05-01 PROCEDURE — 99284 EMERGENCY DEPT VISIT MOD MDM: CPT | Performed by: EMERGENCY MEDICINE

## 2025-05-01 PROCEDURE — 96372 THER/PROPH/DIAG INJ SC/IM: CPT | Performed by: EMERGENCY MEDICINE

## 2025-05-01 RX ORDER — CYCLOBENZAPRINE HCL 10 MG
5 TABLET ORAL ONCE
Status: COMPLETED | OUTPATIENT
Start: 2025-05-01 | End: 2025-05-01

## 2025-05-01 RX ORDER — CYCLOBENZAPRINE HCL 5 MG
5 TABLET ORAL 3 TIMES DAILY PRN
Qty: 20 TABLET | Refills: 0 | Status: SHIPPED | OUTPATIENT
Start: 2025-05-01 | End: 2025-05-11

## 2025-05-01 RX ORDER — LIDOCAINE 560 MG/1
1 PATCH PERCUTANEOUS; TOPICAL; TRANSDERMAL DAILY
Status: DISCONTINUED | OUTPATIENT
Start: 2025-05-01 | End: 2025-05-01 | Stop reason: HOSPADM

## 2025-05-01 RX ORDER — KETOROLAC TROMETHAMINE 30 MG/ML
30 INJECTION, SOLUTION INTRAMUSCULAR; INTRAVENOUS ONCE
Status: COMPLETED | OUTPATIENT
Start: 2025-05-01 | End: 2025-05-01

## 2025-05-01 RX ADMIN — LIDOCAINE 1 PATCH: 4 PATCH TOPICAL at 15:47

## 2025-05-01 RX ADMIN — CYCLOBENZAPRINE 5 MG: 10 TABLET, FILM COATED ORAL at 15:45

## 2025-05-01 RX ADMIN — KETOROLAC TROMETHAMINE 30 MG: 30 INJECTION, SOLUTION INTRAMUSCULAR at 15:45

## 2025-05-01 ASSESSMENT — COLUMBIA-SUICIDE SEVERITY RATING SCALE - C-SSRS
2. HAVE YOU ACTUALLY HAD ANY THOUGHTS OF KILLING YOURSELF?: NO
6. HAVE YOU EVER DONE ANYTHING, STARTED TO DO ANYTHING, OR PREPARED TO DO ANYTHING TO END YOUR LIFE?: NO
1. IN THE PAST MONTH, HAVE YOU WISHED YOU WERE DEAD OR WISHED YOU COULD GO TO SLEEP AND NOT WAKE UP?: NO

## 2025-05-01 NOTE — ED TRIAGE NOTES
Patient to ER for back pain. States she fell 2 days ago ad caught herself but has been having bilateral leg pain and lower back pain

## 2025-05-01 NOTE — ED NOTES
Writer discussed discharge information with patient. Patient verbalized understanding. Questions answered. No acute distress upon discharge.      Yumiko Oseguera RN  05/01/25 [id number]

## 2025-05-03 NOTE — ED PROVIDER NOTES
HPI   Chief Complaint   Patient presents with    Back Pain       HPI  Patient is a 69-year-old female brought to the ED today via EMS for back pain.  Patient explains that she is in the process of moving, and has been doing a lot of lifting and bending over recently.  She also has a history of chronic back pain for which she follows with pain management and has had previous spinal surgeries.  She explains that 2 days ago, she was walking when she tripped on something and started falling forward.  She was able to catch herself by placing her right hand on the wall, and never actually fell to the ground.  Since then however, she has had worsening pain to her right lower back radiating down her right leg.  Patient has had previous sciatica, and states that this feels similar.  She has been taking her home Percocet and gabapentin which she has for chronic pain, with minimal relief.  As she still has pain today, she called EMS and brought here to the ED for further management. Patient denies recent fever, history of IV drug abuse, history of malignancy, direct trauma to the back, saddle paresthesia, bowel or bladder incontinence, previous infection of the brain, spinal cord or vertebral column, use of anticoagulation.      Patient History   Medical History[1]  Surgical History[2]  Family History[3]  Social History[4]    Physical Exam   ED Triage Vitals [05/01/25 1516]   Temperature Heart Rate Respirations BP   36.4 °C (97.5 °F) 97 18 139/80      Pulse Ox Temp src Heart Rate Source Patient Position   98 % -- -- --      BP Location FiO2 (%)     -- --       Physical Exam  Vitals and nursing note reviewed.   Constitutional:       General: She is not in acute distress.  HENT:      Head: Normocephalic.      Mouth/Throat:      Mouth: Mucous membranes are moist.   Eyes:      Extraocular Movements: Extraocular movements intact.      Conjunctiva/sclera: Conjunctivae normal.   Cardiovascular:      Rate and Rhythm: Normal rate and  regular rhythm.      Pulses: Normal pulses.   Pulmonary:      Effort: Pulmonary effort is normal. No respiratory distress.      Breath sounds: Normal breath sounds. No wheezing.   Abdominal:      General: There is no distension.      Palpations: Abdomen is soft.      Tenderness: There is no abdominal tenderness.   Musculoskeletal:         General: No swelling.      Cervical back: Neck supple.      Comments: No midline cervical, thoracic, or lumbar spine tenderness to palpation.  Right paralumbar tenderness to palpation   Skin:     General: Skin is warm and dry.      Capillary Refill: Capillary refill takes less than 2 seconds.   Neurological:      General: No focal deficit present.      Mental Status: She is alert. Mental status is at baseline.      Comments: No saddle paresthesia.  Lower extremities demonstrate symmetrical 5/5 strength for flexion and extension at the hip, abduction at the hip, flexion and extension at the knee, dorsiflexion and plantarflexion at the ankle.  Full ROM with hip, knee and ankle.   Bones nontender to palpation.  Normal sensation to light touch over all dermatomes of the thigh, calf and foot bilaterally.  No contusions, abrasions, lacerations, pallor or cyanosis noted on the skin.  +2 DP and PT pulses bilaterally.   Cap refill <2 seconds bilaterally.           ED Course & MDM   Diagnoses as of 05/03/25 0749   Lumbar strain, initial encounter   Acute right-sided low back pain with bilateral sciatica             No data recorded     Lisa Coma Scale Score: 15 (05/01/25 1517 : Yumiko Oseguera RN)                         Medical Decision Making  Patient was seen and evaluated for back pain.  Differential diagnosis includes but is not limited to musculoskeletal pain, kidney stone, pyelonephritis, cauda equina, spinal epidural abscess, vertebral fracture.  However, as patient's history and exam are consistent with musculoskeletal pain and associated sciatica, and patient does not have any  red flag signs/symptoms for back pain, additional labs and imaging are not indicated at this time.  Patient is treated symptomatically with Toradol 30 mg IM, 4% lidocaine transdermal patch, and flexeril 5 mg PO.  On reevaluation, patient is resting comfortably in bed.  She states that her pain has improved after administration of the medications.  All questions and concerns were answered. Discharge planning with close outpatient follow-up was discussed at this time, to which the patient was agreeable. Strict return precautions were given, and patient was discharged home in stable condition with a prescription for flexeril 5 mg as needed for pain.    Tests/Medications/Escalations of Care considered but not given:  Considered additional tests such as CBC, CMP, ESR, CRP, CT/MRI imaging of the back.  However, as patient has no red flag signs/symptoms of back pain, and history/exam are consistent with musculoskeletal pain, additional tests are not indicated at this time.      Procedure  Procedures       [1]   Past Medical History:  Diagnosis Date    Abdominal distension (gaseous) 06/11/2018    Abdominal bloating    Cellulitis of face 12/29/2016    Cellulitis, face    Encounter for screening for malignant neoplasm of cervix     Pap smear for cervical cancer screening    Impacted cerumen, bilateral 07/01/2016    Bilateral impacted cerumen    Impacted cerumen, left ear 01/26/2015    Left ear impacted cerumen    Ocular pain, left eye 12/22/2016    Eye pain, left    Other intervertebral disc displacement, lumbar region     Lumbar herniated disc    Personal history of diseases of the blood and blood-forming organs and certain disorders involving the immune mechanism 08/30/2019    History of thrombocytopenia    Personal history of malignant neoplasm of cervix uteri     History of malignant neoplasm of cervix uteri    Personal history of other diseases of the digestive system     History of hemorrhoids    Personal history of  other diseases of the respiratory system 03/22/2016    History of bronchitis    Personal history of other diseases of the respiratory system 07/01/2016    History of sore throat    Personal history of other diseases of urinary system 06/15/2015    History of hematuria    Personal history of other drug therapy 10/03/2016    History of influenza vaccination    Personal history of other medical treatment     H/O mammogram    Personal history of other medical treatment     Transfusion history    Personal history of other specified conditions 02/23/2018    History of abdominal pain    Personal history of other specified conditions 08/04/2015    History of dysuria    Personal history of other specified conditions 07/14/2014    History of fever    Personal history of other specified conditions 05/04/2018    History of confusion    Spinal stenosis, lumbar region without neurogenic claudication     Lumbar stenosis   [2]   Past Surgical History:  Procedure Laterality Date    COLONOSCOPY  08/26/2017    Complete Colonoscopy    CT ANGIO CORONARY ART WITH HEARTFLOW IF SCORE >30%  2/1/2021    CT HEART CORONARY ANGIOGRAM 2/1/2021 [facility]    KNEE ARTHROSCOPY W/ DEBRIDEMENT  09/09/2013    Arthroscopy Knee    LUMBAR FUSION  03/05/2014    Lumbar Vertebral Fusion    LUMBAR LAMINECTOMY  09/09/2013    Laminectomy Lumbar    MR HEAD ANGIO WO IV CONTRAST  9/10/2022    MR HEAD ANGIO WO IV CONTRAST 9/10/2022 [facility]    MR NECK ANGIO WO IV CONTRAST  9/10/2022    MR NECK ANGIO WO IV CONTRAST 9/10/2022 [facility]    OTHER SURGICAL HISTORY  09/09/2013    Nerve Block Transforaminal Epidural Lumbar    OTHER SURGICAL HISTORY  01/26/2015    Cervical Conization By Cold Knife    OTHER SURGICAL HISTORY  06/15/2015    Ovarian Cystectomy    ROTATOR CUFF REPAIR  09/09/2013    Rotator Cuff Repair    TOTAL KNEE ARTHROPLASTY  09/09/2013    Knee Replacement    TUBAL LIGATION  06/15/2015    Tubal Ligation   [3]   Family  History  Problem Relation Name Age of Onset    Pernicious anemia Father      Pancreatic cancer Father     [4]   Social History  Tobacco Use    Smoking status: Former     Current packs/day: 0.00     Types: Cigarettes     Quit date:      Years since quittin.3     Passive exposure: Past    Smokeless tobacco: Never   Vaping Use    Vaping status: Never Used   Substance Use Topics    Alcohol use: Not Currently    Drug use: Never        Marielos KWAN MD  [phone]

## 2025-06-06 ENCOUNTER — APPOINTMENT (OUTPATIENT)
Dept: PRIMARY CARE | Facility: CLINIC | Age: 70
End: 2025-06-06
Payer: MEDICARE

## 2025-06-23 ENCOUNTER — APPOINTMENT (OUTPATIENT)
Dept: PRIMARY CARE | Facility: CLINIC | Age: 70
End: 2025-06-23
Payer: MEDICARE

## 2025-06-23 VITALS
DIASTOLIC BLOOD PRESSURE: 60 MMHG | HEIGHT: 66 IN | BODY MASS INDEX: 31.18 KG/M2 | SYSTOLIC BLOOD PRESSURE: 120 MMHG | WEIGHT: 194 LBS | OXYGEN SATURATION: 95 % | HEART RATE: 78 BPM

## 2025-06-23 DIAGNOSIS — F32.2 MAJOR DEPRESSIVE DISORDER, SEVERE (MULTI): ICD-10-CM

## 2025-06-23 DIAGNOSIS — F41.9 ANXIETY: Primary | Chronic | ICD-10-CM

## 2025-06-23 DIAGNOSIS — K21.9 GASTROESOPHAGEAL REFLUX DISEASE WITHOUT ESOPHAGITIS: ICD-10-CM

## 2025-06-23 PROCEDURE — 3074F SYST BP LT 130 MM HG: CPT | Performed by: STUDENT IN AN ORGANIZED HEALTH CARE EDUCATION/TRAINING PROGRAM

## 2025-06-23 PROCEDURE — 3008F BODY MASS INDEX DOCD: CPT | Performed by: STUDENT IN AN ORGANIZED HEALTH CARE EDUCATION/TRAINING PROGRAM

## 2025-06-23 PROCEDURE — 3078F DIAST BP <80 MM HG: CPT | Performed by: STUDENT IN AN ORGANIZED HEALTH CARE EDUCATION/TRAINING PROGRAM

## 2025-06-23 PROCEDURE — 1159F MED LIST DOCD IN RCRD: CPT | Performed by: STUDENT IN AN ORGANIZED HEALTH CARE EDUCATION/TRAINING PROGRAM

## 2025-06-23 PROCEDURE — 1160F RVW MEDS BY RX/DR IN RCRD: CPT | Performed by: STUDENT IN AN ORGANIZED HEALTH CARE EDUCATION/TRAINING PROGRAM

## 2025-06-23 PROCEDURE — 1036F TOBACCO NON-USER: CPT | Performed by: STUDENT IN AN ORGANIZED HEALTH CARE EDUCATION/TRAINING PROGRAM

## 2025-06-23 PROCEDURE — 99214 OFFICE O/P EST MOD 30 MIN: CPT | Performed by: STUDENT IN AN ORGANIZED HEALTH CARE EDUCATION/TRAINING PROGRAM

## 2025-06-23 RX ORDER — CLONAZEPAM 0.5 MG/1
1 TABLET ORAL DAILY PRN
Qty: 30 TABLET | Refills: 2 | Status: SHIPPED | OUTPATIENT
Start: 2025-06-23 | End: 2025-12-20

## 2025-06-23 RX ORDER — PANTOPRAZOLE SODIUM 40 MG/1
40 TABLET, DELAYED RELEASE ORAL DAILY
Qty: 30 TABLET | Refills: 0 | Status: SHIPPED | OUTPATIENT
Start: 2025-06-23 | End: 2025-08-22

## 2025-06-23 ASSESSMENT — PATIENT HEALTH QUESTIONNAIRE - PHQ9
1. LITTLE INTEREST OR PLEASURE IN DOING THINGS: NOT AT ALL
SUM OF ALL RESPONSES TO PHQ9 QUESTIONS 1 AND 2: 0
2. FEELING DOWN, DEPRESSED OR HOPELESS: NOT AT ALL

## 2025-06-23 NOTE — PROGRESS NOTES
Subjective   Patient ID: Ruth Ann Martinez is a 69 y.o. female who presents for Follow-up (Follow up /Having a lot of stomach pain, especially after eating. Had lost some unintentional weight as well).  History of Present Illness  The patient presents for evaluation of abdominal pain.    She has been experiencing abdominal discomfort, particularly after meals, for the past 2 weeks. The pain is localized in the stomach and radiates towards the right side. She reports a weight loss of 8 pounds during this period, with a recent regain of 1 pound. Accompanying symptoms include nausea and fatigue. She describes the pain as a squeezing sensation that typically occurs 1 to 2 hours post-meal. She reports no current abdominal pain or discomfort. She has not taken omeprazole in the past. She does not report any dysphagia but mentions a long-standing issue of choking slightly after consuming food or beverages. She has not been using ibuprofen or other anti-inflammatories. Her daily diet includes one cup of coffee, and she has been avoiding spicy foods and red sauces. She does not consume alcohol. She still has her gallbladder.     She has consulted a gastroenterologist on several occasions due to diarrhea. A colonoscopy performed 01/2024 was normal. A CT scan conducted in 01/2025 did not reveal any gallstones. She was prescribed Citrucel, which she took once daily, but discontinued it 2 days ago due to exacerbation of her diarrhea. She has been managing her symptoms with over-the-counter Tums.    She reports difficulty falling asleep.    She very rarely uses Klonopin. Up to date on CSA and UDS.     PAST SURGICAL HISTORY:  EGD performed 2 years ago.   Colonoscopy performed 01/2024.    SOCIAL HISTORY  She does not drink alcohol.    Review of Systems  ROS otherwise negative aside from what was mentioned above in HPI.    Objective     /60 (BP Location: Left arm, Patient Position: Sitting, BP Cuff Size: Adult)   Pulse 78    "Ht 1.676 m (5' 6\")   Wt 88 kg (194 lb)   SpO2 95%   BMI 31.31 kg/m²      Current Outpatient Medications   Medication Instructions    baclofen (LIORESAL) 10 mg, 3 times daily    clonazePAM (KLONOPIN) 1 mg, oral, Daily PRN    dicyclomine (BENTYL) 10 mg, oral, 3 times daily    divalproex (DEPAKOTE) 500 mg, oral, Daily, Do not crush, chew, or split.    gabapentin (NEURONTIN) 1,200 mg, 2 times daily    lidocaine (Lidoderm) 5 % patch 1 patch, transdermal, Daily, Remove & discard patch within 12 hours or as directed by MD.    melatonin 5-10 mg, Nightly PRN    ondansetron (ZOFRAN) 4 mg, oral, Every 12 hours PRN    oxyCODONE-acetaminophen (Percocet) 7.5-325 mg tablet 1 tablet, 3 times daily    pantoprazole (PROTONIX) 40 mg, oral, Daily, Do not crush, chew, or split.    sertraline (ZOLOFT) 100 mg, oral, Every morning    tiZANidine (ZANAFLEX) 4 mg, oral, Every 6 hours PRN    traZODone (DESYREL) 100-200 mg, oral, Nightly       Physical Exam  Respiratory: Clear to auscultation, no wheezing, rales or rhonchi  Cardiovascular: Regular rate and rhythm, no murmurs, rubs, or gallops  Gastrointestinal: Soft, no tenderness, no distention, no masses    Results  Imaging   - CAT scan: 01/2025, No gallstones    Diagnostic Testing   - Colonoscopy: Normal    Assessment & Plan  1. Abdominal pain.  - Symptoms suggest possible gastritis, potentially due to heartburn or chronic stomach inflammation.  - Schatzki's ring noted on prior EGD; colonoscopy 1.5 years ago was normal; CT scan from 01/2025 showed no gallstones.  - Blood pressure today is 120/60.  - Pantoprazole prescribed for 2 weeks; if symptoms improve but persist, continue for 1 month; if no improvement, order ultrasound and refer to GI.    2. Insomnia.  - Reports difficulty falling asleep.  - Sleep pattern remains unchanged.    3. Medication management.  - Klonopin refill sent to pharmacy.  - Rarely uses Klonopin but has been under stress due to recent move.  -CSA and UDS up to " date    Follow-up  - Patient to send a message via [employer] in 2 weeks to report symptom status.    Follow up in 3 months    Cookie Luciano DO     This medical note was created with the assistance of artificial intelligence (AI) for documentation purposes. The content has been reviewed and confirmed by the healthcare provider for accuracy and completeness. Patient consented to the use of audio recording and use of AI during their visit.

## 2025-06-26 ENCOUNTER — APPOINTMENT (OUTPATIENT)
Dept: RADIOLOGY | Facility: HOSPITAL | Age: 70
End: 2025-06-26
Payer: MEDICARE

## 2025-06-26 ENCOUNTER — HOSPITAL ENCOUNTER (EMERGENCY)
Facility: HOSPITAL | Age: 70
Discharge: HOME | End: 2025-06-27
Attending: EMERGENCY MEDICINE
Payer: MEDICARE

## 2025-06-26 ENCOUNTER — HOSPITAL ENCOUNTER (OUTPATIENT)
Dept: CARDIOLOGY | Facility: HOSPITAL | Age: 70
Discharge: HOME | End: 2025-06-26
Payer: MEDICARE

## 2025-06-26 DIAGNOSIS — M54.6 ACUTE THORACIC BACK PAIN, UNSPECIFIED BACK PAIN LATERALITY: ICD-10-CM

## 2025-06-26 DIAGNOSIS — R07.9 CHEST PAIN: ICD-10-CM

## 2025-06-26 DIAGNOSIS — M62.838 MUSCLE SPASM: Primary | ICD-10-CM

## 2025-06-26 PROCEDURE — 36415 COLL VENOUS BLD VENIPUNCTURE: CPT | Performed by: EMERGENCY MEDICINE

## 2025-06-26 PROCEDURE — 83880 ASSAY OF NATRIURETIC PEPTIDE: CPT | Performed by: EMERGENCY MEDICINE

## 2025-06-26 PROCEDURE — 87637 SARSCOV2&INF A&B&RSV AMP PRB: CPT | Performed by: EMERGENCY MEDICINE

## 2025-06-26 PROCEDURE — 83735 ASSAY OF MAGNESIUM: CPT | Performed by: EMERGENCY MEDICINE

## 2025-06-26 PROCEDURE — 71045 X-RAY EXAM CHEST 1 VIEW: CPT | Performed by: SURGERY

## 2025-06-26 PROCEDURE — 85379 FIBRIN DEGRADATION QUANT: CPT | Performed by: EMERGENCY MEDICINE

## 2025-06-26 PROCEDURE — 99284 EMERGENCY DEPT VISIT MOD MDM: CPT | Performed by: EMERGENCY MEDICINE

## 2025-06-26 PROCEDURE — 84484 ASSAY OF TROPONIN QUANT: CPT | Performed by: EMERGENCY MEDICINE

## 2025-06-26 PROCEDURE — 93005 ELECTROCARDIOGRAM TRACING: CPT

## 2025-06-26 PROCEDURE — 85025 COMPLETE CBC W/AUTO DIFF WBC: CPT | Performed by: EMERGENCY MEDICINE

## 2025-06-26 PROCEDURE — 71045 X-RAY EXAM CHEST 1 VIEW: CPT

## 2025-06-26 PROCEDURE — 80053 COMPREHEN METABOLIC PANEL: CPT | Performed by: EMERGENCY MEDICINE

## 2025-06-26 ASSESSMENT — LIFESTYLE VARIABLES
EVER FELT BAD OR GUILTY ABOUT YOUR DRINKING: NO
EVER HAD A DRINK FIRST THING IN THE MORNING TO STEADY YOUR NERVES TO GET RID OF A HANGOVER: NO
TOTAL SCORE: 0
HAVE PEOPLE ANNOYED YOU BY CRITICIZING YOUR DRINKING: NO
HAVE YOU EVER FELT YOU SHOULD CUT DOWN ON YOUR DRINKING: NO

## 2025-06-26 ASSESSMENT — PAIN SCALES - GENERAL
PAINLEVEL_OUTOF10: 8
PAINLEVEL_OUTOF10: 8

## 2025-06-26 ASSESSMENT — PAIN DESCRIPTION - LOCATION: LOCATION: CHEST

## 2025-06-26 ASSESSMENT — PAIN - FUNCTIONAL ASSESSMENT: PAIN_FUNCTIONAL_ASSESSMENT: 0-10

## 2025-06-26 ASSESSMENT — PAIN DESCRIPTION - DIRECTION: RADIATING_TOWARDS: MID BACK

## 2025-06-26 ASSESSMENT — PAIN DESCRIPTION - PAIN TYPE: TYPE: ACUTE PAIN

## 2025-06-26 ASSESSMENT — PAIN DESCRIPTION - ONSET: ONSET: ONGOING

## 2025-06-26 ASSESSMENT — PAIN DESCRIPTION - ORIENTATION: ORIENTATION: MID

## 2025-06-26 ASSESSMENT — PAIN DESCRIPTION - DESCRIPTORS
DESCRIPTORS: SPASM
DESCRIPTORS: SHARP

## 2025-06-27 VITALS
RESPIRATION RATE: 15 BRPM | OXYGEN SATURATION: 96 % | HEIGHT: 66 IN | WEIGHT: 194 LBS | SYSTOLIC BLOOD PRESSURE: 172 MMHG | BODY MASS INDEX: 31.18 KG/M2 | DIASTOLIC BLOOD PRESSURE: 80 MMHG | HEART RATE: 63 BPM | TEMPERATURE: 98.6 F

## 2025-06-27 LAB
ALBUMIN SERPL BCP-MCNC: 4.2 G/DL (ref 3.4–5)
ALP SERPL-CCNC: 77 U/L (ref 33–136)
ALT SERPL W P-5'-P-CCNC: 8 U/L (ref 7–45)
ANION GAP SERPL CALC-SCNC: 13 MMOL/L (ref 10–20)
AST SERPL W P-5'-P-CCNC: 13 U/L (ref 9–39)
BASOPHILS # BLD AUTO: 0.01 X10*3/UL (ref 0–0.1)
BASOPHILS NFR BLD AUTO: 0.2 %
BILIRUB SERPL-MCNC: 0.4 MG/DL (ref 0–1.2)
BNP SERPL-MCNC: 22 PG/ML (ref 0–99)
BUN SERPL-MCNC: 17 MG/DL (ref 6–23)
CALCIUM SERPL-MCNC: 9.3 MG/DL (ref 8.6–10.3)
CARDIAC TROPONIN I PNL SERPL HS: 3 NG/L (ref 0–13)
CARDIAC TROPONIN I PNL SERPL HS: 4 NG/L (ref 0–13)
CHLORIDE SERPL-SCNC: 108 MMOL/L (ref 98–107)
CO2 SERPL-SCNC: 26 MMOL/L (ref 21–32)
CREAT SERPL-MCNC: 0.99 MG/DL (ref 0.5–1.05)
D DIMER PPP FEU-MCNC: 301 NG/ML FEU
EGFRCR SERPLBLD CKD-EPI 2021: 63 ML/MIN/1.73M*2
EOSINOPHIL # BLD AUTO: 0.07 X10*3/UL (ref 0–0.7)
EOSINOPHIL NFR BLD AUTO: 1.3 %
ERYTHROCYTE [DISTWIDTH] IN BLOOD BY AUTOMATED COUNT: 12.3 % (ref 11.5–14.5)
FLUAV RNA RESP QL NAA+PROBE: NOT DETECTED
FLUBV RNA RESP QL NAA+PROBE: NOT DETECTED
GLUCOSE SERPL-MCNC: 90 MG/DL (ref 74–99)
HCT VFR BLD AUTO: 40.8 % (ref 36–46)
HGB BLD-MCNC: 13.5 G/DL (ref 12–16)
IMM GRANULOCYTES # BLD AUTO: 0.01 X10*3/UL (ref 0–0.7)
IMM GRANULOCYTES NFR BLD AUTO: 0.2 % (ref 0–0.9)
LYMPHOCYTES # BLD AUTO: 1.46 X10*3/UL (ref 1.2–4.8)
LYMPHOCYTES NFR BLD AUTO: 27.8 %
MAGNESIUM SERPL-MCNC: 1.92 MG/DL (ref 1.6–2.4)
MCH RBC QN AUTO: 30.1 PG (ref 26–34)
MCHC RBC AUTO-ENTMCNC: 33.1 G/DL (ref 32–36)
MCV RBC AUTO: 91 FL (ref 80–100)
MONOCYTES # BLD AUTO: 0.31 X10*3/UL (ref 0.1–1)
MONOCYTES NFR BLD AUTO: 5.9 %
NEUTROPHILS # BLD AUTO: 3.39 X10*3/UL (ref 1.2–7.7)
NEUTROPHILS NFR BLD AUTO: 64.6 %
NRBC BLD-RTO: 0 /100 WBCS (ref 0–0)
PLATELET # BLD AUTO: 117 X10*3/UL (ref 150–450)
POTASSIUM SERPL-SCNC: 3.8 MMOL/L (ref 3.5–5.3)
PROT SERPL-MCNC: 6.9 G/DL (ref 6.4–8.2)
RBC # BLD AUTO: 4.49 X10*6/UL (ref 4–5.2)
RSV RNA RESP QL NAA+PROBE: NOT DETECTED
SARS-COV-2 RNA RESP QL NAA+PROBE: NOT DETECTED
SODIUM SERPL-SCNC: 143 MMOL/L (ref 136–145)
WBC # BLD AUTO: 5.3 X10*3/UL (ref 4.4–11.3)

## 2025-06-27 PROCEDURE — 96376 TX/PRO/DX INJ SAME DRUG ADON: CPT

## 2025-06-27 PROCEDURE — 2500000001 HC RX 250 WO HCPCS SELF ADMINISTERED DRUGS (ALT 637 FOR MEDICARE OP): Performed by: EMERGENCY MEDICINE

## 2025-06-27 PROCEDURE — 36415 COLL VENOUS BLD VENIPUNCTURE: CPT | Performed by: EMERGENCY MEDICINE

## 2025-06-27 PROCEDURE — 84484 ASSAY OF TROPONIN QUANT: CPT | Performed by: EMERGENCY MEDICINE

## 2025-06-27 PROCEDURE — 96375 TX/PRO/DX INJ NEW DRUG ADDON: CPT

## 2025-06-27 PROCEDURE — 2500000004 HC RX 250 GENERAL PHARMACY W/ HCPCS (ALT 636 FOR OP/ED): Mod: JZ | Performed by: EMERGENCY MEDICINE

## 2025-06-27 PROCEDURE — 96374 THER/PROPH/DIAG INJ IV PUSH: CPT | Mod: 59

## 2025-06-27 RX ORDER — CYCLOBENZAPRINE HCL 10 MG
5 TABLET ORAL ONCE
Status: COMPLETED | OUTPATIENT
Start: 2025-06-27 | End: 2025-06-27

## 2025-06-27 RX ORDER — ONDANSETRON HYDROCHLORIDE 2 MG/ML
4 INJECTION, SOLUTION INTRAVENOUS ONCE
Status: COMPLETED | OUTPATIENT
Start: 2025-06-27 | End: 2025-06-27

## 2025-06-27 RX ORDER — CYCLOBENZAPRINE HCL 10 MG
10 TABLET ORAL 2 TIMES DAILY PRN
Qty: 20 TABLET | Refills: 0 | Status: SHIPPED | OUTPATIENT
Start: 2025-06-27 | End: 2025-07-07

## 2025-06-27 RX ORDER — HYDROMORPHONE HYDROCHLORIDE 1 MG/ML
1 INJECTION, SOLUTION INTRAMUSCULAR; INTRAVENOUS; SUBCUTANEOUS ONCE
Status: COMPLETED | OUTPATIENT
Start: 2025-06-27 | End: 2025-06-27

## 2025-06-27 RX ADMIN — CYCLOBENZAPRINE 5 MG: 10 TABLET, FILM COATED ORAL at 00:36

## 2025-06-27 RX ADMIN — HYDROMORPHONE HYDROCHLORIDE 1 MG: 1 INJECTION, SOLUTION INTRAMUSCULAR; INTRAVENOUS; SUBCUTANEOUS at 00:38

## 2025-06-27 RX ADMIN — ONDANSETRON 4 MG: 2 INJECTION INTRAMUSCULAR; INTRAVENOUS at 00:37

## 2025-06-27 RX ADMIN — HYDROMORPHONE HYDROCHLORIDE 0.4 MG: 1 INJECTION, SOLUTION INTRAMUSCULAR; INTRAVENOUS; SUBCUTANEOUS at 02:00

## 2025-06-27 ASSESSMENT — PAIN SCALES - GENERAL
PAINLEVEL_OUTOF10: 7
PAINLEVEL_OUTOF10: 4
PAINLEVEL_OUTOF10: 7
PAINLEVEL_OUTOF10: 8

## 2025-06-27 NOTE — DISCHARGE INSTRUCTIONS
Please take Motrin/Tylenol for pain control as well as your chronic pain medication.  Use Flexeril for the concern of muscle spasm to your upper back.  Return to the emergency department if you develop any worsening pain or if concerns arise.  Please follow-up with your primary care doctor in 2 to 3 days.

## 2025-06-27 NOTE — ED PROVIDER NOTES
HPI   Chief Complaint   Patient presents with    Chest Pain    Back Pain       This is a 69 years old female patient presented to the emergency department with a chief back pain radiating.  Stated that she was playing a game on her iPad and when she stood up the pain started suddenly in her back radiating to the chest.  She had pain like this in the past but not as severe.  Stated that the pain is 8 out of 10 in severity.  Feels nauseated, denies any headaches, lightheadedness, dizziness, neck pain, fever, chills, body aches, abdominal pain, diarrhea, constipation, urinary symptoms, weakness or focal numbness.  Denies any cardiac history.    Review of system: See above in the HPI section              Patient History   Medical History[1]  Surgical History[2]  Family History[3]  Social History[4]    Physical Exam   ED Triage Vitals [06/26/25 2254]   Temperature Heart Rate Respirations BP   36.6 °C (97.9 °F) 71 19 (!) 190/92      Pulse Ox Temp Source Heart Rate Source Patient Position   99 % Temporal Monitor Sitting      BP Location FiO2 (%)     Left arm --       Physical Exam  Vitals and nursing note reviewed.   Constitutional:       Appearance: She is well-developed.   HENT:      Head: Normocephalic and atraumatic.   Eyes:      Extraocular Movements: Extraocular movements intact.      Pupils: Pupils are equal, round, and reactive to light.   Cardiovascular:      Rate and Rhythm: Normal rate and regular rhythm.      Heart sounds: Normal heart sounds. No murmur heard.  Pulmonary:      Effort: Pulmonary effort is normal.      Breath sounds: Normal breath sounds. No decreased breath sounds, wheezing, rhonchi or rales.   Chest:      Chest wall: Tenderness present.   Abdominal:      Palpations: Abdomen is soft.      Tenderness: There is no abdominal tenderness. There is no guarding or rebound.   Musculoskeletal:         General: Normal range of motion.      Cervical back: Normal range of motion.      Comments: Limited  range of motion to the thoracic region and sidebending and rotation.  Tenderness over the midsternal region on palpation   Skin:     General: Skin is warm and dry.      Capillary Refill: Capillary refill takes less than 2 seconds.   Neurological:      General: No focal deficit present.      Mental Status: She is alert and oriented to person, place, and time.   Psychiatric:         Mood and Affect: Mood normal.           ED Course & MDM   ED Course as of 06/27/25 0218   Thu Jun 26, 2025   2300 EKG revealed normal sinus rhythm, rate is 69 bpm, normal AL, QRS, normal QTc duration.  No ST segment or T wave pathology.  Good R wave progression throughout the precordial leads [ME]      ED Course User Index  [ME] Rusty Rossi DO         Diagnoses as of 06/27/25 0218   Muscle spasm   Acute thoracic back pain, unspecified back pain laterality                 No data recorded                                 Medical Decision Making  Patient is seen and examined.  Will obtain cardiac workup.  EKG as interpreted by myself in the ED course section.  Will administer Dilaudid, Zofran, and Flexeril for symptoms control.  I believe the patient's presentation is musculoskeletal in nature.    Workup is unremarkable including high-sensitivity troponin and D-dimer.  Chest x-ray is unremarkable.  Patient reported improvement in her symptoms.  Discharged home to follow-up with her primary care provider with instruction to return to the emergency department if alarming symptoms arise as per discharge instruction.        Procedure  Procedures       Rusty BATISTA DO Flo  06/27/25 0218         [1]   Past Medical History:  Diagnosis Date    Abdominal distension (gaseous) 06/11/2018    Abdominal bloating    Cellulitis of face 12/29/2016    Cellulitis, face    Encounter for screening for malignant neoplasm of cervix     Pap smear for cervical cancer screening    Impacted cerumen, bilateral 07/01/2016    Bilateral impacted cerumen    Impacted  cerumen, left ear 01/26/2015    Left ear impacted cerumen    Ocular pain, left eye 12/22/2016    Eye pain, left    Other intervertebral disc displacement, lumbar region     Lumbar herniated disc    Personal history of diseases of the blood and blood-forming organs and certain disorders involving the immune mechanism 08/30/2019    History of thrombocytopenia    Personal history of malignant neoplasm of cervix uteri     History of malignant neoplasm of cervix uteri    Personal history of other diseases of the digestive system     History of hemorrhoids    Personal history of other diseases of the respiratory system 03/22/2016    History of bronchitis    Personal history of other diseases of the respiratory system 07/01/2016    History of sore throat    Personal history of other diseases of urinary system 06/15/2015    History of hematuria    Personal history of other drug therapy 10/03/2016    History of influenza vaccination    Personal history of other medical treatment     H/O mammogram    Personal history of other medical treatment     Transfusion history    Personal history of other specified conditions 02/23/2018    History of abdominal pain    Personal history of other specified conditions 08/04/2015    History of dysuria    Personal history of other specified conditions 07/14/2014    History of fever    Personal history of other specified conditions 05/04/2018    History of confusion    Spinal stenosis, lumbar region without neurogenic claudication     Lumbar stenosis   [2]   Past Surgical History:  Procedure Laterality Date    COLONOSCOPY  08/26/2017    Complete Colonoscopy    CT ANGIO CORONARY ART WITH HEARTFLOW IF SCORE >30%  2/1/2021    CT HEART CORONARY ANGIOGRAM 2/1/2021 [facility]    KNEE ARTHROSCOPY W/ DEBRIDEMENT  09/09/2013    Arthroscopy Knee    LUMBAR FUSION  03/05/2014    Lumbar Vertebral Fusion    LUMBAR LAMINECTOMY  09/09/2013    Laminectomy Lumbar    MR HEAD ANGIO WO IV CONTRAST   9/10/2022    MR HEAD ANGIO WO IV CONTRAST 9/10/2022 [facility]    MR NECK ANGIO WO IV CONTRAST  9/10/2022    MR NECK ANGIO WO IV CONTRAST 9/10/2022 [facility]    OTHER SURGICAL HISTORY  2013    Nerve Block Transforaminal Epidural Lumbar    OTHER SURGICAL HISTORY  2015    Cervical Conization By Cold Knife    OTHER SURGICAL HISTORY  06/15/2015    Ovarian Cystectomy    ROTATOR CUFF REPAIR  2013    Rotator Cuff Repair    TOTAL KNEE ARTHROPLASTY  2013    Knee Replacement    TUBAL LIGATION  06/15/2015    Tubal Ligation   [3]   Family History  Problem Relation Name Age of Onset    Pernicious anemia Father      Pancreatic cancer Father     [4]   Social History  Tobacco Use    Smoking status: Former     Current packs/day: 0.00     Types: Cigarettes     Quit date:      Years since quittin.5     Passive exposure: Past    Smokeless tobacco: Never   Vaping Use    Vaping status: Never Used   Substance Use Topics    Alcohol use: Not Currently    Drug use: Never        Rusty Rossi DO  25 0219

## 2025-07-09 ENCOUNTER — TELEPHONE (OUTPATIENT)
Dept: PRIMARY CARE | Facility: CLINIC | Age: 70
End: 2025-07-09
Payer: MEDICARE

## 2025-07-23 LAB
ATRIAL RATE: 69 BPM
P AXIS: 66 DEGREES
PR INTERVAL: 164 MS
Q ONSET: 251 MS
QRS COUNT: 12 BEATS
QRS DURATION: 90 MS
QT INTERVAL: 390 MS
QTC CALCULATION(BAZETT): 418 MS
QTC FREDERICIA: 408 MS
R AXIS: 2 DEGREES
T AXIS: 37 DEGREES
T OFFSET: 446 MS
VENTRICULAR RATE: 69 BPM

## 2025-07-23 PROCEDURE — 93005 ELECTROCARDIOGRAM TRACING: CPT

## 2025-07-24 DIAGNOSIS — F51.01 PRIMARY INSOMNIA: ICD-10-CM

## 2025-07-24 RX ORDER — TRAZODONE HYDROCHLORIDE 100 MG/1
100-200 TABLET ORAL NIGHTLY
Qty: 180 TABLET | Refills: 1 | Status: SHIPPED | OUTPATIENT
Start: 2025-07-24

## 2025-07-29 DIAGNOSIS — F41.9 ANXIETY: Chronic | ICD-10-CM

## 2025-07-29 RX ORDER — SERTRALINE HYDROCHLORIDE 100 MG/1
100 TABLET, FILM COATED ORAL
Qty: 90 TABLET | Refills: 3 | Status: SHIPPED | OUTPATIENT
Start: 2025-07-29

## 2025-08-21 ENCOUNTER — APPOINTMENT (OUTPATIENT)
Dept: NEUROLOGY | Facility: CLINIC | Age: 70
End: 2025-08-21
Payer: MEDICARE

## 2025-08-25 ENCOUNTER — APPOINTMENT (OUTPATIENT)
Dept: RADIOLOGY | Facility: HOSPITAL | Age: 70
End: 2025-08-25
Payer: MEDICARE

## 2025-08-25 ENCOUNTER — HOSPITAL ENCOUNTER (EMERGENCY)
Facility: HOSPITAL | Age: 70
Discharge: HOME | End: 2025-08-25
Attending: EMERGENCY MEDICINE
Payer: MEDICARE

## 2025-08-25 ENCOUNTER — APPOINTMENT (OUTPATIENT)
Dept: CARDIOLOGY | Facility: HOSPITAL | Age: 70
End: 2025-08-25
Payer: MEDICARE

## 2025-08-25 ASSESSMENT — PAIN SCALES - GENERAL: PAINLEVEL_OUTOF10: 0 - NO PAIN

## 2025-08-25 ASSESSMENT — PAIN - FUNCTIONAL ASSESSMENT: PAIN_FUNCTIONAL_ASSESSMENT: 0-10

## 2025-09-05 ENCOUNTER — PATIENT OUTREACH (OUTPATIENT)
Dept: PRIMARY CARE | Facility: CLINIC | Age: 70
End: 2025-09-05
Payer: MEDICARE

## 2025-09-05 RX ORDER — AMITRIPTYLINE HYDROCHLORIDE 10 MG/1
10 TABLET, FILM COATED ORAL NIGHTLY
COMMUNITY
Start: 2025-09-04

## 2025-09-23 ENCOUNTER — APPOINTMENT (OUTPATIENT)
Dept: PRIMARY CARE | Facility: CLINIC | Age: 70
End: 2025-09-23
Payer: MEDICARE